# Patient Record
Sex: MALE | Race: BLACK OR AFRICAN AMERICAN | Employment: PART TIME
[De-identification: names, ages, dates, MRNs, and addresses within clinical notes are randomized per-mention and may not be internally consistent; named-entity substitution may affect disease eponyms.]

---

## 2017-01-04 ENCOUNTER — OFFICE VISIT (OUTPATIENT)
Dept: PODIATRY | Facility: CLINIC | Age: 47
End: 2017-01-04

## 2017-01-04 VITALS
TEMPERATURE: 97.9 F | HEART RATE: 65 BPM | HEIGHT: 66 IN | BODY MASS INDEX: 28.61 KG/M2 | DIASTOLIC BLOOD PRESSURE: 79 MMHG | WEIGHT: 178 LBS | SYSTOLIC BLOOD PRESSURE: 123 MMHG

## 2017-01-04 DIAGNOSIS — L89.90: ICD-10-CM

## 2017-01-04 DIAGNOSIS — M79.672 PAIN IN BOTH FEET: ICD-10-CM

## 2017-01-04 DIAGNOSIS — L84 CORNS AND CALLOSITIES: ICD-10-CM

## 2017-01-04 DIAGNOSIS — M20.5X2 HALLUX LIMITUS, LEFT: ICD-10-CM

## 2017-01-04 DIAGNOSIS — M20.10 HAV (HALLUX ABDUCTO VALGUS), UNSPECIFIED LATERALITY: ICD-10-CM

## 2017-01-04 DIAGNOSIS — M20.5X1 HALLUX LIMITUS, RIGHT: ICD-10-CM

## 2017-01-04 DIAGNOSIS — M79.671 PAIN IN BOTH FEET: ICD-10-CM

## 2017-01-04 DIAGNOSIS — D21.9: Primary | ICD-10-CM

## 2017-01-04 PROCEDURE — 11057 PARNG/CUTG B9 HYPRKR LES >4: CPT | Performed by: PODIATRIST

## 2017-01-04 PROCEDURE — 99213 OFFICE O/P EST LOW 20 MIN: CPT | Performed by: PODIATRIST

## 2017-02-01 ENCOUNTER — OFFICE VISIT (OUTPATIENT)
Dept: PODIATRY | Facility: CLINIC | Age: 47
End: 2017-02-01

## 2017-02-01 VITALS
HEIGHT: 66 IN | WEIGHT: 178 LBS | SYSTOLIC BLOOD PRESSURE: 124 MMHG | HEART RATE: 76 BPM | DIASTOLIC BLOOD PRESSURE: 81 MMHG | BODY MASS INDEX: 28.61 KG/M2

## 2017-02-01 DIAGNOSIS — M79.672 PAIN IN BOTH FEET: ICD-10-CM

## 2017-02-01 DIAGNOSIS — L84 CALLUS OF FOOT: ICD-10-CM

## 2017-02-01 DIAGNOSIS — Q82.8 POROKERATOSIS: Primary | ICD-10-CM

## 2017-02-01 DIAGNOSIS — M79.671 PAIN IN BOTH FEET: ICD-10-CM

## 2017-02-01 PROCEDURE — 99213 OFFICE O/P EST LOW 20 MIN: CPT | Performed by: PODIATRIST

## 2017-02-01 PROCEDURE — G8419 CALC BMI OUT NRM PARAM NOF/U: HCPCS | Performed by: PODIATRIST

## 2017-02-01 PROCEDURE — G8427 DOCREV CUR MEDS BY ELIG CLIN: HCPCS | Performed by: PODIATRIST

## 2017-02-01 PROCEDURE — 17110 DESTRUCTION B9 LES UP TO 14: CPT | Performed by: PODIATRIST

## 2017-02-01 PROCEDURE — G8484 FLU IMMUNIZE NO ADMIN: HCPCS | Performed by: PODIATRIST

## 2017-02-01 PROCEDURE — 4004F PT TOBACCO SCREEN RCVD TLK: CPT | Performed by: PODIATRIST

## 2017-02-15 ENCOUNTER — OFFICE VISIT (OUTPATIENT)
Dept: PODIATRY | Facility: CLINIC | Age: 47
End: 2017-02-15

## 2017-02-15 ENCOUNTER — HOSPITAL ENCOUNTER (OUTPATIENT)
Age: 47
Discharge: HOME | End: 2017-02-15
Payer: MEDICARE

## 2017-02-15 VITALS
WEIGHT: 183.4 LBS | HEIGHT: 66 IN | SYSTOLIC BLOOD PRESSURE: 116 MMHG | HEART RATE: 76 BPM | BODY MASS INDEX: 29.47 KG/M2 | DIASTOLIC BLOOD PRESSURE: 78 MMHG

## 2017-02-15 DIAGNOSIS — L84 HELOMA MOLLE: Primary | ICD-10-CM

## 2017-02-15 DIAGNOSIS — M79.675 PAIN OF TOE OF LEFT FOOT: ICD-10-CM

## 2017-02-15 PROCEDURE — 99213 OFFICE O/P EST LOW 20 MIN: CPT | Performed by: PODIATRIST

## 2017-02-15 PROCEDURE — G8419 CALC BMI OUT NRM PARAM NOF/U: HCPCS | Performed by: PODIATRIST

## 2017-02-15 PROCEDURE — 11055 PARING/CUTG B9 HYPRKER LES 1: CPT | Performed by: PODIATRIST

## 2017-02-15 PROCEDURE — 4004F PT TOBACCO SCREEN RCVD TLK: CPT | Performed by: PODIATRIST

## 2017-02-15 PROCEDURE — G8484 FLU IMMUNIZE NO ADMIN: HCPCS | Performed by: PODIATRIST

## 2017-02-15 PROCEDURE — G8427 DOCREV CUR MEDS BY ELIG CLIN: HCPCS | Performed by: PODIATRIST

## 2017-03-08 ENCOUNTER — HOSPITAL ENCOUNTER (OUTPATIENT)
Age: 47
Discharge: HOME | End: 2017-03-08
Payer: MEDICARE

## 2017-03-08 ENCOUNTER — OFFICE VISIT (OUTPATIENT)
Dept: PODIATRY | Facility: CLINIC | Age: 47
End: 2017-03-08

## 2017-03-08 VITALS
DIASTOLIC BLOOD PRESSURE: 80 MMHG | WEIGHT: 185 LBS | TEMPERATURE: 98.6 F | BODY MASS INDEX: 29.73 KG/M2 | HEART RATE: 79 BPM | HEIGHT: 66 IN | SYSTOLIC BLOOD PRESSURE: 134 MMHG

## 2017-03-08 DIAGNOSIS — M79.671 PAIN IN BOTH FEET: ICD-10-CM

## 2017-03-08 DIAGNOSIS — M79.672 PAIN IN BOTH FEET: ICD-10-CM

## 2017-03-08 DIAGNOSIS — M21.42 PES PLANUS OF BOTH FEET: ICD-10-CM

## 2017-03-08 DIAGNOSIS — L84 CALLUS OF FOOT: Primary | ICD-10-CM

## 2017-03-08 DIAGNOSIS — M21.41 PES PLANUS OF BOTH FEET: ICD-10-CM

## 2017-03-08 PROCEDURE — 4004F PT TOBACCO SCREEN RCVD TLK: CPT | Performed by: PODIATRIST

## 2017-03-08 PROCEDURE — G8419 CALC BMI OUT NRM PARAM NOF/U: HCPCS | Performed by: PODIATRIST

## 2017-03-08 PROCEDURE — 11056 PARNG/CUTG B9 HYPRKR LES 2-4: CPT | Performed by: PODIATRIST

## 2017-03-08 PROCEDURE — 99212 OFFICE O/P EST SF 10 MIN: CPT | Performed by: PODIATRIST

## 2017-03-08 PROCEDURE — 99213 OFFICE O/P EST LOW 20 MIN: CPT | Performed by: PODIATRIST

## 2017-03-08 PROCEDURE — G8484 FLU IMMUNIZE NO ADMIN: HCPCS | Performed by: PODIATRIST

## 2017-03-08 PROCEDURE — G8427 DOCREV CUR MEDS BY ELIG CLIN: HCPCS | Performed by: PODIATRIST

## 2017-03-09 ENCOUNTER — HOSPITAL ENCOUNTER (OUTPATIENT)
Age: 47
Discharge: HOME | End: 2017-03-09
Payer: MEDICARE

## 2017-03-09 ENCOUNTER — OFFICE VISIT (OUTPATIENT)
Dept: FAMILY MEDICINE CLINIC | Facility: CLINIC | Age: 47
End: 2017-03-09

## 2017-03-09 VITALS
DIASTOLIC BLOOD PRESSURE: 76 MMHG | HEART RATE: 75 BPM | BODY MASS INDEX: 30.08 KG/M2 | TEMPERATURE: 98.7 F | WEIGHT: 187.2 LBS | HEIGHT: 66 IN | SYSTOLIC BLOOD PRESSURE: 121 MMHG

## 2017-03-09 DIAGNOSIS — Z72.0 TOBACCO ABUSE: ICD-10-CM

## 2017-03-09 DIAGNOSIS — Z13.9 SCREENING: ICD-10-CM

## 2017-03-09 DIAGNOSIS — M10.00 ACUTE IDIOPATHIC GOUT, UNSPECIFIED SITE: Primary | ICD-10-CM

## 2017-03-09 PROCEDURE — G0009 ADMIN PNEUMOCOCCAL VACCINE: HCPCS | Performed by: FAMILY MEDICINE

## 2017-03-09 PROCEDURE — 90732 PPSV23 VACC 2 YRS+ SUBQ/IM: CPT | Performed by: FAMILY MEDICINE

## 2017-03-09 PROCEDURE — G8417 CALC BMI ABV UP PARAM F/U: HCPCS | Performed by: FAMILY MEDICINE

## 2017-03-09 PROCEDURE — 4004F PT TOBACCO SCREEN RCVD TLK: CPT | Performed by: FAMILY MEDICINE

## 2017-03-09 PROCEDURE — 90688 IIV4 VACCINE SPLT 0.5 ML IM: CPT | Performed by: FAMILY MEDICINE

## 2017-03-09 PROCEDURE — G8484 FLU IMMUNIZE NO ADMIN: HCPCS | Performed by: FAMILY MEDICINE

## 2017-03-09 PROCEDURE — 99213 OFFICE O/P EST LOW 20 MIN: CPT | Performed by: FAMILY MEDICINE

## 2017-03-09 PROCEDURE — G8427 DOCREV CUR MEDS BY ELIG CLIN: HCPCS | Performed by: FAMILY MEDICINE

## 2017-03-09 PROCEDURE — G0008 ADMIN INFLUENZA VIRUS VAC: HCPCS | Performed by: FAMILY MEDICINE

## 2017-03-09 RX ORDER — NICOTINE 21 MG/24HR
1 PATCH, TRANSDERMAL 24 HOURS TRANSDERMAL EVERY 24 HOURS
Qty: 30 PATCH | Refills: 3 | Status: SHIPPED | OUTPATIENT
Start: 2017-03-09 | End: 2017-04-13 | Stop reason: ALTCHOICE

## 2017-03-09 RX ORDER — NAPROXEN 500 MG/1
500 TABLET ORAL 2 TIMES DAILY WITH MEALS
Qty: 60 TABLET | Refills: 3 | Status: SHIPPED | OUTPATIENT
Start: 2017-03-09 | End: 2017-10-26 | Stop reason: SDUPTHER

## 2017-03-09 ASSESSMENT — ENCOUNTER SYMPTOMS
ABDOMINAL PAIN: 0
DIARRHEA: 0
SHORTNESS OF BREATH: 0
VOMITING: 0
NAUSEA: 0
BLOOD IN STOOL: 0

## 2017-04-13 ENCOUNTER — OFFICE VISIT (OUTPATIENT)
Dept: FAMILY MEDICINE CLINIC | Age: 47
End: 2017-04-13
Payer: MEDICARE

## 2017-04-13 ENCOUNTER — HOSPITAL ENCOUNTER (OUTPATIENT)
Age: 47
Setting detail: SPECIMEN
Discharge: HOME | End: 2017-04-13
Payer: MEDICARE

## 2017-04-13 VITALS
HEART RATE: 75 BPM | WEIGHT: 185.8 LBS | DIASTOLIC BLOOD PRESSURE: 79 MMHG | SYSTOLIC BLOOD PRESSURE: 129 MMHG | BODY MASS INDEX: 29.99 KG/M2 | TEMPERATURE: 98.3 F

## 2017-04-13 DIAGNOSIS — Z13.9 SCREENING: ICD-10-CM

## 2017-04-13 DIAGNOSIS — Z72.0 TOBACCO ABUSE: Primary | ICD-10-CM

## 2017-04-13 LAB — HIV AG/AB: NONREACTIVE

## 2017-04-13 PROCEDURE — G8417 CALC BMI ABV UP PARAM F/U: HCPCS | Performed by: FAMILY MEDICINE

## 2017-04-13 PROCEDURE — 4004F PT TOBACCO SCREEN RCVD TLK: CPT | Performed by: FAMILY MEDICINE

## 2017-04-13 PROCEDURE — 99213 OFFICE O/P EST LOW 20 MIN: CPT

## 2017-04-13 PROCEDURE — 99213 OFFICE O/P EST LOW 20 MIN: CPT | Performed by: FAMILY MEDICINE

## 2017-04-13 PROCEDURE — G8427 DOCREV CUR MEDS BY ELIG CLIN: HCPCS | Performed by: FAMILY MEDICINE

## 2017-04-13 RX ORDER — VARENICLINE TARTRATE 25 MG
KIT ORAL
Qty: 53 TABLET | Refills: 0 | Status: SHIPPED | OUTPATIENT
Start: 2017-04-13 | End: 2021-12-01

## 2017-04-13 ASSESSMENT — PATIENT HEALTH QUESTIONNAIRE - PHQ9
SUM OF ALL RESPONSES TO PHQ9 QUESTIONS 1 & 2: 0
1. LITTLE INTEREST OR PLEASURE IN DOING THINGS: 0
SUM OF ALL RESPONSES TO PHQ QUESTIONS 1-9: 0
2. FEELING DOWN, DEPRESSED OR HOPELESS: 0

## 2017-04-13 ASSESSMENT — ENCOUNTER SYMPTOMS
VOMITING: 0
NAUSEA: 0
COUGH: 0
DIARRHEA: 0
SHORTNESS OF BREATH: 0

## 2017-05-10 ENCOUNTER — PROCEDURE VISIT (OUTPATIENT)
Dept: PODIATRY | Age: 47
End: 2017-05-10
Payer: MEDICARE

## 2017-05-10 VITALS
BODY MASS INDEX: 29.25 KG/M2 | HEIGHT: 66 IN | DIASTOLIC BLOOD PRESSURE: 80 MMHG | WEIGHT: 182 LBS | SYSTOLIC BLOOD PRESSURE: 116 MMHG | HEART RATE: 73 BPM | TEMPERATURE: 98.1 F

## 2017-05-10 DIAGNOSIS — M79.672 PAIN IN BOTH FEET: ICD-10-CM

## 2017-05-10 DIAGNOSIS — B35.9 TINEA: ICD-10-CM

## 2017-05-10 DIAGNOSIS — M79.671 PAIN IN BOTH FEET: ICD-10-CM

## 2017-05-10 DIAGNOSIS — L84 HELOMA MOLLE: ICD-10-CM

## 2017-05-10 DIAGNOSIS — L84 CALLUS OF FOOT: Primary | ICD-10-CM

## 2017-05-10 PROCEDURE — 11057 PARNG/CUTG B9 HYPRKR LES >4: CPT | Performed by: PODIATRIST

## 2017-05-10 PROCEDURE — 99213 OFFICE O/P EST LOW 20 MIN: CPT

## 2017-05-10 PROCEDURE — 99213 OFFICE O/P EST LOW 20 MIN: CPT | Performed by: PODIATRIST

## 2017-05-10 RX ORDER — TOLNAFTATE 1 G/100G
POWDER TOPICAL
Qty: 1 CAN | Refills: 2 | Status: SHIPPED | OUTPATIENT
Start: 2017-05-10 | End: 2017-10-26 | Stop reason: ALTCHOICE

## 2017-06-14 ENCOUNTER — OFFICE VISIT (OUTPATIENT)
Dept: FAMILY MEDICINE CLINIC | Age: 47
End: 2017-06-14
Payer: MEDICARE

## 2017-06-14 VITALS
BODY MASS INDEX: 29.28 KG/M2 | DIASTOLIC BLOOD PRESSURE: 72 MMHG | TEMPERATURE: 96.7 F | HEIGHT: 66 IN | WEIGHT: 182.2 LBS | SYSTOLIC BLOOD PRESSURE: 130 MMHG | HEART RATE: 71 BPM

## 2017-06-14 DIAGNOSIS — Z72.0 TOBACCO ABUSE: ICD-10-CM

## 2017-06-14 DIAGNOSIS — M62.838 MUSCLE SPASM OF LEFT LOWER EXTREMITY: Primary | ICD-10-CM

## 2017-06-14 PROCEDURE — G8427 DOCREV CUR MEDS BY ELIG CLIN: HCPCS | Performed by: FAMILY MEDICINE

## 2017-06-14 PROCEDURE — 99213 OFFICE O/P EST LOW 20 MIN: CPT | Performed by: FAMILY MEDICINE

## 2017-06-14 PROCEDURE — 1036F TOBACCO NON-USER: CPT | Performed by: FAMILY MEDICINE

## 2017-06-14 PROCEDURE — 99213 OFFICE O/P EST LOW 20 MIN: CPT

## 2017-06-14 PROCEDURE — G8417 CALC BMI ABV UP PARAM F/U: HCPCS | Performed by: FAMILY MEDICINE

## 2017-06-14 ASSESSMENT — ENCOUNTER SYMPTOMS
BACK PAIN: 1
SHORTNESS OF BREATH: 0
BLOOD IN STOOL: 0
ABDOMINAL PAIN: 0
DIARRHEA: 0
NAUSEA: 0
VOMITING: 0

## 2017-06-21 ENCOUNTER — OFFICE VISIT (OUTPATIENT)
Dept: PODIATRY | Age: 47
End: 2017-06-21
Payer: MEDICARE

## 2017-06-21 VITALS
DIASTOLIC BLOOD PRESSURE: 66 MMHG | WEIGHT: 181 LBS | SYSTOLIC BLOOD PRESSURE: 115 MMHG | HEIGHT: 66 IN | BODY MASS INDEX: 29.09 KG/M2 | HEART RATE: 72 BPM | TEMPERATURE: 98.3 F

## 2017-06-21 DIAGNOSIS — L84 HELOMA MOLLE: ICD-10-CM

## 2017-06-21 DIAGNOSIS — M21.41 PES PLANUS OF BOTH FEET: ICD-10-CM

## 2017-06-21 DIAGNOSIS — M72.2 PLANTAR FASCIITIS OF RIGHT FOOT: ICD-10-CM

## 2017-06-21 DIAGNOSIS — M21.42 PES PLANUS OF BOTH FEET: ICD-10-CM

## 2017-06-21 DIAGNOSIS — L84 FOOT CALLUS: Primary | ICD-10-CM

## 2017-06-21 PROCEDURE — 99213 OFFICE O/P EST LOW 20 MIN: CPT | Performed by: PODIATRIST

## 2017-06-21 PROCEDURE — G8417 CALC BMI ABV UP PARAM F/U: HCPCS | Performed by: PODIATRIST

## 2017-06-21 PROCEDURE — 11057 PARNG/CUTG B9 HYPRKR LES >4: CPT | Performed by: PODIATRIST

## 2017-06-21 PROCEDURE — 1036F TOBACCO NON-USER: CPT | Performed by: PODIATRIST

## 2017-06-21 PROCEDURE — 99213 OFFICE O/P EST LOW 20 MIN: CPT

## 2017-06-21 PROCEDURE — G8427 DOCREV CUR MEDS BY ELIG CLIN: HCPCS | Performed by: PODIATRIST

## 2017-06-21 RX ORDER — UREA 200 MG/G
GEL TOPICAL
Qty: 1 CONTAINER | Refills: 5 | Status: SHIPPED | OUTPATIENT
Start: 2017-06-21 | End: 2021-12-01

## 2017-06-29 ENCOUNTER — HOSPITAL ENCOUNTER (OUTPATIENT)
Age: 47
Discharge: HOME | End: 2017-06-29
Payer: MEDICARE

## 2017-06-29 ENCOUNTER — HOSPITAL ENCOUNTER (OUTPATIENT)
Dept: GENERAL RADIOLOGY | Age: 47
Discharge: HOME | End: 2017-06-29
Payer: MEDICARE

## 2017-06-29 DIAGNOSIS — M72.2 PLANTAR FASCIITIS OF RIGHT FOOT: ICD-10-CM

## 2017-06-29 DIAGNOSIS — M21.42 PES PLANUS OF BOTH FEET: ICD-10-CM

## 2017-06-29 DIAGNOSIS — M21.41 PES PLANUS OF BOTH FEET: ICD-10-CM

## 2017-06-29 DIAGNOSIS — L84 FOOT CALLUS: ICD-10-CM

## 2017-06-29 PROCEDURE — 73630 X-RAY EXAM OF FOOT: CPT

## 2017-06-30 ENCOUNTER — HOSPITAL ENCOUNTER (OUTPATIENT)
Dept: PHYSICAL THERAPY | Age: 47
Setting detail: THERAPIES SERIES
Discharge: HOME | End: 2017-06-30
Payer: MEDICARE

## 2017-06-30 PROCEDURE — 97161 PT EVAL LOW COMPLEX 20 MIN: CPT

## 2017-06-30 PROCEDURE — 97110 THERAPEUTIC EXERCISES: CPT

## 2017-06-30 PROCEDURE — G8979 MOBILITY GOAL STATUS: HCPCS

## 2017-06-30 PROCEDURE — G8978 MOBILITY CURRENT STATUS: HCPCS

## 2017-07-06 ENCOUNTER — HOSPITAL ENCOUNTER (OUTPATIENT)
Dept: PHYSICAL THERAPY | Age: 47
Setting detail: THERAPIES SERIES
Discharge: HOME | End: 2017-07-06
Payer: COMMERCIAL

## 2017-07-10 ENCOUNTER — HOSPITAL ENCOUNTER (OUTPATIENT)
Dept: PHYSICAL THERAPY | Age: 47
Setting detail: THERAPIES SERIES
Discharge: HOME | End: 2017-07-10
Payer: COMMERCIAL

## 2017-07-10 PROCEDURE — 97110 THERAPEUTIC EXERCISES: CPT

## 2017-07-13 ENCOUNTER — HOSPITAL ENCOUNTER (OUTPATIENT)
Dept: PHYSICAL THERAPY | Age: 47
Setting detail: THERAPIES SERIES
Discharge: HOME | End: 2017-07-13
Payer: COMMERCIAL

## 2017-07-18 ENCOUNTER — HOSPITAL ENCOUNTER (EMERGENCY)
Age: 47
Discharge: HOME | End: 2017-07-18
Attending: EMERGENCY MEDICINE
Payer: COMMERCIAL

## 2017-07-18 ENCOUNTER — APPOINTMENT (OUTPATIENT)
Dept: GENERAL RADIOLOGY | Age: 47
End: 2017-07-18
Payer: COMMERCIAL

## 2017-07-18 VITALS
OXYGEN SATURATION: 99 % | SYSTOLIC BLOOD PRESSURE: 149 MMHG | TEMPERATURE: 98.8 F | DIASTOLIC BLOOD PRESSURE: 99 MMHG | RESPIRATION RATE: 16 BRPM | HEART RATE: 72 BPM

## 2017-07-18 DIAGNOSIS — S93.402A SPRAIN OF LEFT ANKLE, UNSPECIFIED LIGAMENT, INITIAL ENCOUNTER: Primary | ICD-10-CM

## 2017-07-18 PROCEDURE — 6370000000 HC RX 637 (ALT 250 FOR IP): Performed by: EMERGENCY MEDICINE

## 2017-07-18 PROCEDURE — 99284 EMERGENCY DEPT VISIT MOD MDM: CPT

## 2017-07-18 PROCEDURE — 73610 X-RAY EXAM OF ANKLE: CPT

## 2017-07-18 PROCEDURE — 73630 X-RAY EXAM OF FOOT: CPT

## 2017-07-18 RX ORDER — IBUPROFEN 800 MG/1
800 TABLET ORAL ONCE
Status: COMPLETED | OUTPATIENT
Start: 2017-07-18 | End: 2017-07-18

## 2017-07-18 RX ORDER — IBUPROFEN 200 MG
800 TABLET ORAL EVERY 8 HOURS PRN
Qty: 80 TABLET | Refills: 0 | Status: SHIPPED | OUTPATIENT
Start: 2017-07-18 | End: 2020-08-24

## 2017-07-18 RX ADMIN — IBUPROFEN 800 MG: 800 TABLET, FILM COATED ORAL at 02:39

## 2017-07-18 ASSESSMENT — PAIN SCALES - GENERAL
PAINLEVEL_OUTOF10: 10
PAINLEVEL_OUTOF10: 10

## 2017-07-18 ASSESSMENT — ENCOUNTER SYMPTOMS
VOMITING: 0
COUGH: 0
SHORTNESS OF BREATH: 0
NAUSEA: 0
SORE THROAT: 0
ABDOMINAL PAIN: 0

## 2017-07-18 ASSESSMENT — PAIN DESCRIPTION - ORIENTATION: ORIENTATION: LEFT

## 2017-07-18 ASSESSMENT — PAIN DESCRIPTION - LOCATION: LOCATION: ANKLE

## 2017-07-18 ASSESSMENT — PAIN DESCRIPTION - PAIN TYPE: TYPE: ACUTE PAIN

## 2017-09-22 ENCOUNTER — TELEPHONE (OUTPATIENT)
Dept: ADMINISTRATIVE | Age: 47
End: 2017-09-22

## 2017-09-29 ENCOUNTER — TELEPHONE (OUTPATIENT)
Dept: ADMINISTRATIVE | Age: 47
End: 2017-09-29

## 2017-10-26 ENCOUNTER — OFFICE VISIT (OUTPATIENT)
Dept: FAMILY MEDICINE CLINIC | Age: 47
End: 2017-10-26
Payer: COMMERCIAL

## 2017-10-26 VITALS
HEART RATE: 69 BPM | DIASTOLIC BLOOD PRESSURE: 79 MMHG | BODY MASS INDEX: 29.35 KG/M2 | SYSTOLIC BLOOD PRESSURE: 123 MMHG | HEIGHT: 66 IN | WEIGHT: 182.6 LBS | TEMPERATURE: 98.3 F

## 2017-10-26 DIAGNOSIS — Z00.00 ANNUAL PHYSICAL EXAM: Primary | ICD-10-CM

## 2017-10-26 DIAGNOSIS — M10.00 ACUTE IDIOPATHIC GOUT, UNSPECIFIED SITE: ICD-10-CM

## 2017-10-26 PROCEDURE — 90688 IIV4 VACCINE SPLT 0.5 ML IM: CPT | Performed by: FAMILY MEDICINE

## 2017-10-26 PROCEDURE — G0439 PPPS, SUBSEQ VISIT: HCPCS | Performed by: FAMILY MEDICINE

## 2017-10-26 PROCEDURE — G0008 ADMIN INFLUENZA VIRUS VAC: HCPCS | Performed by: FAMILY MEDICINE

## 2017-10-26 RX ORDER — NAPROXEN 500 MG/1
500 TABLET ORAL 2 TIMES DAILY WITH MEALS
Qty: 60 TABLET | Refills: 3 | Status: SHIPPED | OUTPATIENT
Start: 2017-10-26 | End: 2019-09-09 | Stop reason: SDUPTHER

## 2017-10-26 ASSESSMENT — PATIENT HEALTH QUESTIONNAIRE - PHQ9
2. FEELING DOWN, DEPRESSED OR HOPELESS: 0
1. LITTLE INTEREST OR PLEASURE IN DOING THINGS: 0
SUM OF ALL RESPONSES TO PHQ9 QUESTIONS 1 & 2: 0
SUM OF ALL RESPONSES TO PHQ QUESTIONS 1-9: 0

## 2017-10-26 ASSESSMENT — ANXIETY QUESTIONNAIRES: GAD7 TOTAL SCORE: 0

## 2017-10-26 NOTE — PATIENT INSTRUCTIONS
Thank you for letting us take care of you today. We hope all your questions were addressed. If a question was overlooked or something else comes to mind after you return home, please contact a member of your Care Team listed below. Please make sure you have a routine office visit set up to follow-up on [address]. Your Care Team at [place] [address] is Team #4  Rajesh Buchanan MD (Faculty)  Marycarmen Montano MD (Geovanna Gray MD (Resident)  Evi Winters MD (Resident)  Ashu Lacey MD (Resident)  Abel Prakash MD (Resident)  MUKESH Ortiz., RMA  Fausto Parada., BRADEN Reynolds ([facility])  Cristiano Perez RN, ([id number])  Ingrid Martinez, Ph.D., (Behavioral Services)  Hong Huff, [facility] (Clinical Pharmacist)       Office phone number: [phone]    If you need to get in right away due to illness, please be advised we have \"Same Day\" appointments available Monday-Friday. Please call us at [phone] option #1 to schedule your \"Same Day\" appointment.

## 2017-10-26 NOTE — PROGRESS NOTES
Visit Information    Have you changed or started any medications since your last visit including any over-the-counter medicines, vitamins, or herbal medicines? no   Have you stopped taking any of your medications? Is so, why? -  no  Are you having any side effects from any of your medications? - no    Have you seen any other physician or provider since your last visit?  no   Have you had any other diagnostic tests since your last visit?  no   Have you been seen in the emergency room and/or had an admission in a hospital since we last saw you?  no   Have you had your routine dental cleaning in the past 6 months?  no     Do you have an active MyChart account? If no, what is the barrier?   Yes    Patient Care Team:  Kiersten Nino MD as PCP - Timbo Bonilla MD as PCP - [facility] Attributed Provider    Medical History Review  Past Medical, Family, and Social History reviewed and does not contribute to the patient presenting condition    Health Maintenance   Topic Date Due    Flu vaccine (1) 09/01/2017    Diabetes screen  03/18/2019    Lipid screen  03/18/2021    DTaP/Tdap/Td vaccine (2 - Td) 08/25/2026    HIV screen  Completed

## 2017-10-26 NOTE — PROGRESS NOTES
Medicare Annual Wellness Visit - Initial    Name: Leticia Johansen Date: 10/26/2017   MRN: [id number] Sex: Male   Age: 52 y.o. Ethnicity: Non-/Non    : 1970 Race: [facility] is here for   Chief Complaint   Patient presents with    Annual Exam        Screenings for behavioral, psychosocial and functional/safety risks, and cognitive dysfunction are all negative except as indicated below. These results, as well as other patient data from the [address] form, are documented in Flowsheets linked to this Encounter. No Known Allergies    Prior to Visit Medications    Medication Sig Taking? Authorizing Provider   naproxen (NAPROSYN) 500 MG tablet Take 1 tablet by mouth 2 times daily (with meals) Yes Sage Ho MD   ibuprofen (ADVIL;MOTRIN) 200 MG tablet Take 4 tablets by mouth every 8 hours as needed for Pain Yes Marni Arnold MD   urea (CARMOL) 30 % cream Apply topically as needed. Yes MarianNATHANIEL SpauldingM   colchicine (COLCRYS) 0.6 MG tablet take 1 tablet by mouth once daily for 7 days Yes Sherif Ayon MD   urea (CARMOL) 20 % cream Apply topically as needed. Marian Kell, DPM   varenicline (CHANTIX STARTING MONTH ) 0.5 MG X 11 & 1 MG X 42 tablet Take by mouth.   Sage Ho MD       Past Medical History:   Diagnosis Date    Gout        Past Surgical History:   Procedure Laterality Date    APPENDECTOMY      WISDOM TOOTH EXTRACTION         Family History   Problem Relation Age of Onset    High Blood Pressure Mother     Alzheimer's Disease Father        CareTeam (Including outside providers/suppliers regularly involved in providing care):   Patient Care Team:  Barrett Lima MD as PCP - Toño Gomez MD as PCP - [facility] Attributed Provider    Wt Readings from Last 3 Encounters:   10/26/17 182 lb 9.6 oz (82.8 kg)   17 181 lb (82.1 kg)   17 182 lb 3.2 oz (82.6 kg)     Vitals:    10/26/17 0834   BP: 123/79   Site: Left Arm   Position: Sitting   Cuff Size: Large Adult   Pulse: 69   Temp: 98.3 °F (36.8 °C)   TempSrc: Temporal   Weight: 182 lb 9.6 oz (82.8 kg)   Height: 5' 6\" (1.676 m)       General Appearance: alert and oriented to person, place and time, well developed and well- nourished, in no acute distress  Skin: warm and dry, no rash or erythema  Head: normocephalic and atraumatic  Eyes: pupils equal, round, and reactive to light, extraocular eye movements intact, conjunctivae normal  ENT: tympanic membrane, external ear and ear canal normal bilaterally, nose without deformity, nasal mucosa and turbinates normal without polyps  Neck: supple and non-tender without mass, no thyromegaly or thyroid nodules, no cervical lymphadenopathy  Pulmonary/Chest: clear to auscultation bilaterally- no wheezes, rales or rhonchi, normal air movement, no respiratory distress  Cardiovascular: normal rate, regular rhythm, normal S1 and S2, no murmurs, rubs, clicks, or gallops, distal pulses intact, no carotid bruits  Abdomen: soft, non-tender, non-distended, normal bowel sounds, no masses or organomegaly  Extremities: no cyanosis, clubbing or edema  Musculoskeletal: normal range of motion, no joint swelling, deformity or tenderness  Neurologic: reflexes normal and symmetric, no cranial nerve deficit, gait, coordination and speech normal    The following problems were reviewed today and where indicated follow up appointments were made and/or referrals ordered.     Risk Factor Screenings with Interventions     Fall Risk:  2 or more falls in past year?: no  Fall with injury in past year?: no  Fall Risk Interventions:    · none indicated    Depression:  PHQ-2 Score: 0  Depression Interventions:  · None indicated    Anxiety:  Anxiety Score: 0  Anxiety Interventions:  · Relaxation techniques discussed    Cognitive:     Cognitive Impairment Interventions:  · None indicated    Substance Abuse:  Social History     Social History Main Topics    Smoking status: Former

## 2017-10-27 NOTE — PROGRESS NOTES
I have reviewed and discussed key elements of [address] [facility] with the resident including plan of care and follow up and agree with the care judie plan.

## 2019-09-09 ENCOUNTER — HOSPITAL ENCOUNTER (EMERGENCY)
Age: 49
Discharge: HOME | End: 2019-09-09
Attending: EMERGENCY MEDICINE
Payer: MEDICARE

## 2019-09-09 VITALS
TEMPERATURE: 97.9 F | DIASTOLIC BLOOD PRESSURE: 91 MMHG | BODY MASS INDEX: 29.38 KG/M2 | SYSTOLIC BLOOD PRESSURE: 144 MMHG | WEIGHT: 182 LBS | HEART RATE: 80 BPM | OXYGEN SATURATION: 98 % | RESPIRATION RATE: 16 BRPM

## 2019-09-09 DIAGNOSIS — M10.00 ACUTE IDIOPATHIC GOUT, UNSPECIFIED SITE: ICD-10-CM

## 2019-09-09 DIAGNOSIS — M10.062 ACUTE IDIOPATHIC GOUT OF LEFT KNEE: Primary | ICD-10-CM

## 2019-09-09 PROCEDURE — 99283 EMERGENCY DEPT VISIT LOW MDM: CPT

## 2019-09-09 PROCEDURE — 6370000000 HC RX 637 (ALT 250 FOR IP): Performed by: EMERGENCY MEDICINE

## 2019-09-09 RX ORDER — NAPROXEN 500 MG/1
500 TABLET ORAL 2 TIMES DAILY WITH MEALS
Qty: 14 TABLET | Refills: 0 | Status: SHIPPED | OUTPATIENT
Start: 2019-09-09 | End: 2021-12-01

## 2019-09-09 RX ORDER — COLCHICINE 0.6 MG/1
1.2 TABLET ORAL ONCE
Status: COMPLETED | OUTPATIENT
Start: 2019-09-09 | End: 2019-09-09

## 2019-09-09 RX ORDER — COLCHICINE 0.6 MG/1
0.6 TABLET ORAL ONCE
Qty: 1 TABLET | Refills: 0 | Status: SHIPPED | OUTPATIENT
Start: 2019-09-09 | End: 2021-12-01

## 2019-09-09 RX ADMIN — COLCHICINE 1.2 MG: 0.6 TABLET, FILM COATED ORAL at 09:57

## 2019-09-09 ASSESSMENT — ENCOUNTER SYMPTOMS
SHORTNESS OF BREATH: 0
ABDOMINAL PAIN: 0
BACK PAIN: 0
VOMITING: 0

## 2019-09-09 ASSESSMENT — PAIN DESCRIPTION - PAIN TYPE: TYPE: ACUTE PAIN

## 2019-09-09 ASSESSMENT — PAIN DESCRIPTION - LOCATION: LOCATION: KNEE

## 2019-09-09 ASSESSMENT — PAIN DESCRIPTION - ORIENTATION: ORIENTATION: LEFT

## 2019-09-09 ASSESSMENT — PAIN SCALES - GENERAL: PAINLEVEL_OUTOF10: 8

## 2019-09-09 NOTE — ED PROVIDER NOTES
cream Apply topically as needed. Qty: 1 Tube, Refills: 3      varenicline (CHANTIX STARTING MONTH GEE) 0.5 MG X 11 & 1 MG X 42 tablet Take by mouth. Qty: 53 tablet, Refills: 0    Associated Diagnoses: Tobacco abuse             ALLERGIES     has No Known Allergies. FAMILY HISTORY     He indicated that his mother is alive. He indicated that his father is . He indicated that his maternal grandmother is . He indicated that his maternal grandfather is . He indicated that his paternal grandmother is . He indicated that his paternal grandfather is . family history includes Alzheimer's Disease in his father; High Blood Pressure in his mother. SOCIAL HISTORY      reports that he has quit smoking. His smoking use included cigarettes. He has a 5.75 pack-year smoking history. He has never used smokeless tobacco. He reports that he drinks alcohol. He reports that he does not use drugs. PHYSICAL EXAM     INITIAL VITALS:  weight is 182 lb (82.6 kg). His temperature is 97.9 °F (36.6 °C). His blood pressure is 144/91 (abnormal) and his pulse is 80. His respiration is 16 and oxygen saturation is 98%. Physical Exam   Constitutional: He is oriented to person, place, and time. He appears well-developed and well-nourished. No distress. HENT:   Head: Normocephalic and atraumatic. Cardiovascular: Normal rate and intact distal pulses. Strong DP and PT pulses   Pulmonary/Chest: Effort normal.   Musculoskeletal:   Left knee mild edema warmth. However full range of motion active and passive. No calf tenderness, no leg edema   Neurological: He is alert and oriented to person, place, and time. 5 out of 5 strength in the left lower externally, intact sensation distally. Skin: Skin is warm and dry. No rash noted. No erythema. Psychiatric: He has a normal mood and affect.  His behavior is normal.       DIFFERENTIAL DIAGNOSIS/ MDM:     Exam consistent with gout given prior

## 2020-04-28 ENCOUNTER — HOSPITAL ENCOUNTER (EMERGENCY)
Age: 50
Discharge: HOME | End: 2020-04-28
Attending: EMERGENCY MEDICINE
Payer: MEDICARE

## 2020-04-28 VITALS
OXYGEN SATURATION: 96 % | RESPIRATION RATE: 18 BRPM | HEART RATE: 87 BPM | DIASTOLIC BLOOD PRESSURE: 73 MMHG | TEMPERATURE: 98.2 F | SYSTOLIC BLOOD PRESSURE: 141 MMHG

## 2020-04-28 PROCEDURE — 6360000002 HC RX W HCPCS: Performed by: STUDENT IN AN ORGANIZED HEALTH CARE EDUCATION/TRAINING PROGRAM

## 2020-04-28 PROCEDURE — 99283 EMERGENCY DEPT VISIT LOW MDM: CPT

## 2020-04-28 PROCEDURE — 6370000000 HC RX 637 (ALT 250 FOR IP): Performed by: STUDENT IN AN ORGANIZED HEALTH CARE EDUCATION/TRAINING PROGRAM

## 2020-04-28 PROCEDURE — 96374 THER/PROPH/DIAG INJ IV PUSH: CPT

## 2020-04-28 RX ORDER — IBUPROFEN 400 MG/1
400 TABLET ORAL EVERY 6 HOURS PRN
Qty: 30 TABLET | Refills: 0 | Status: SHIPPED | OUTPATIENT
Start: 2020-04-28 | End: 2020-08-24

## 2020-04-28 RX ORDER — CYCLOBENZAPRINE HCL 5 MG
5 TABLET ORAL 2 TIMES DAILY PRN
Qty: 10 TABLET | Refills: 0 | Status: SHIPPED | OUTPATIENT
Start: 2020-04-28 | End: 2020-05-08

## 2020-04-28 RX ORDER — LIDOCAINE 4 G/G
1 PATCH TOPICAL ONCE
Status: DISCONTINUED | OUTPATIENT
Start: 2020-04-28 | End: 2020-04-28 | Stop reason: HOSPADM

## 2020-04-28 RX ORDER — ACETAMINOPHEN 325 MG/1
650 TABLET ORAL ONCE
Status: COMPLETED | OUTPATIENT
Start: 2020-04-28 | End: 2020-04-28

## 2020-04-28 RX ORDER — KETOROLAC TROMETHAMINE 30 MG/ML
30 INJECTION, SOLUTION INTRAMUSCULAR; INTRAVENOUS ONCE
Status: COMPLETED | OUTPATIENT
Start: 2020-04-28 | End: 2020-04-28

## 2020-04-28 RX ORDER — ACETAMINOPHEN 325 MG/1
325 TABLET ORAL EVERY 6 HOURS PRN
Qty: 30 TABLET | Refills: 0 | Status: SHIPPED | OUTPATIENT
Start: 2020-04-28 | End: 2020-08-24

## 2020-04-28 RX ADMIN — ACETAMINOPHEN 650 MG: 325 TABLET ORAL at 15:27

## 2020-04-28 RX ADMIN — KETOROLAC TROMETHAMINE 30 MG: 30 INJECTION, SOLUTION INTRAMUSCULAR at 15:27

## 2020-04-28 ASSESSMENT — PAIN DESCRIPTION - DESCRIPTORS: DESCRIPTORS: ACHING;STABBING

## 2020-04-28 ASSESSMENT — ENCOUNTER SYMPTOMS
BACK PAIN: 1
VOMITING: 0
RHINORRHEA: 0
ABDOMINAL DISTENTION: 0
CHEST TIGHTNESS: 0
CONSTIPATION: 0
SHORTNESS OF BREATH: 0
NAUSEA: 0
WHEEZING: 0
ABDOMINAL PAIN: 0
TROUBLE SWALLOWING: 0
SORE THROAT: 0
COUGH: 0
DIARRHEA: 0

## 2020-04-28 ASSESSMENT — PAIN DESCRIPTION - PAIN TYPE: TYPE: ACUTE PAIN

## 2020-04-28 ASSESSMENT — PAIN DESCRIPTION - LOCATION: LOCATION: BACK

## 2020-04-28 ASSESSMENT — PAIN SCALES - GENERAL
PAINLEVEL_OUTOF10: 9
PAINLEVEL_OUTOF10: 9

## 2020-04-28 ASSESSMENT — PAIN DESCRIPTION - FREQUENCY: FREQUENCY: CONTINUOUS

## 2020-04-28 ASSESSMENT — PAIN DESCRIPTION - ORIENTATION: ORIENTATION: LOWER

## 2020-04-28 NOTE — ED PROVIDER NOTES
[facility]  ED  Emergency Department Encounter  Emergency Medicine Resident     Pt Name: Tiny Dyer  MRN: [id number]  [place] 1970  Date of evaluation: 4/28/20  PCP:  Francisco Bautista MD    [address] [place] [address]       Chief Complaint   Patient presents with    Back Pain       HISTORY OFPRESENT ILLNESS  (Location/Symptom, Timing/Onset, Context/Setting, Quality, Duration, Modifying Rhina Rilab.)      Tiny Dyer is a 47 yo male who presents with isolated back pain across his mid lumbar. Patient states the pain began 2 days ago when he is moving a fiberglass tub by himself. Patient states the pain was mild at first but has been increasing in intensity over the past 2 days to the point that he had some difficulty at work today. Patient states the pain is completely relieved by lying down and that it does not bother him when standing up but with sharp movements of the back or with weightbearing he begins to have pain which she rates 9 out of 10 in intensity. Patient denies fever, chills, IV drug use, change in bowel or bladder function, bilateral lower extremity weakness, loss of sensation, saddle anesthesia. Patient has no fever, chills, runny nose no sick contacts, known COVID contacts    PAST MEDICAL / SURGICAL / SOCIAL / FAMILY HISTORY      has a past medical history of Gout.     has a past surgical history that includes Appendectomy and [place] tooth extraction.      Social History     Socioeconomic History    Marital status:      Spouse name: Not on file    Number of children: Not on file    Years of education: Not on file    Highest education level: Not on file   Occupational History    Not on file   Social Needs    Financial resource strain: Not on file    Food insecurity     Worry: Not on file     Inability: Not on file    Transportation needs     Medical: Not on file     Non-medical: Not on file   Tobacco Use    Smoking status: Former Smoker     Packs/day: 0.25 Years: 23.00     Pack years: 5.75     Types: Cigarettes    Smokeless tobacco: Never Used    Tobacco comment: quit smoking 4/1/17   Substance and Sexual Activity    Alcohol use: Yes     Comment: socially    Drug use: No    Sexual activity: Not on file   Lifestyle    Physical activity     Days per week: Not on file     Minutes per session: Not on file    Stress: Not on file   Relationships    Social connections     Talks on phone: Not on file     Gets together: Not on file     Attends Sabianism service: Not on file     Active member of club or organization: Not on file     Attends meetings of clubs or organizations: Not on file     Relationship status: Not on file    Intimate partner violence     Fear of current or ex partner: Not on file     Emotionally abused: Not on file     Physically abused: Not on file     Forced sexual activity: Not on file   Other Topics Concern    Not on file   Social History Narrative    Not on file       Family History   Problem Relation Age of Onset    High Blood Pressure Mother     Alzheimer's Disease Father         Allergies:  Patient has no known allergies. Home Medications:  Prior to Admission medications    Medication Sig Start Date End Date Taking?  Authorizing Provider   ibuprofen (IBU) 400 MG tablet Take 1 tablet by mouth every 6 hours as needed for Pain 4/28/20  Yes Jessica Rachel MD   acetaminophen (TYLENOL) 325 MG tablet Take 1 tablet by mouth every 6 hours as needed for Pain 4/28/20  Yes Jessica Rachel MD   cyclobenzaprine (FLEXERIL) 5 MG tablet Take 1 tablet by mouth 2 times daily as needed for Muscle spasms 4/28/20 5/8/20 Yes Jessica Rachel MD   colchicine (COLCRYS) 0.6 MG tablet Take 1 tablet by mouth once for 1 dose 9/9/19 9/9/19  Vlad Nieves MD   naproxen (NAPROSYN) 500 MG tablet Take 1 tablet by mouth 2 times daily (with meals) 9/9/19   Vlad Nieves MD   ibuprofen (ADVIL;MOTRIN) 200 MG tablet Take 4 tablets by mouth every 8 hours display      RADIOLOGY:  No results found. EKG      All EKG's are interpreted by the Emergency Department Physicianwho either signs or Co-signs this chart in the absence of a cardiologist.    EMERGENCY DEPARTMENT COURSE:      Secondary to the fact the patient has no acute trauma to the area, there is tenderness to palpation only at the paraspinal area, lack of change in bowel or bladder function, no saddle anesthesia or weakness is believed that this is most likely a muscle strain or soft tissue contusion. Patient's pain has been properly reduced by nonnarcotic analgesia, plan to discharge with lidocaine patch on, Tylenol, ibuprofen and 5 days with the Flexeril and have patient follow-up with her primary care provider if symptoms persist    PROCEDURES:  None    CONSULTS:  None    CRITICAL CARE:  Please see attending note    FINAL IMPRESSION      1. Strain of lumbar region, initial encounter          DISPOSITION / PLAN     DISPOSITION Decision To Discharge 04/28/2020 04:00:03 PM      PATIENT REFERRED TO:  [facility]  [address] [place] [address]  [phone]    As needed    Stephenie Corrales MD  [address]  [phone]    In 2 weeks  if symptoms persist      DISCHARGE MEDICATIONS:  Discharge Medication List as of 4/28/2020  4:02 PM      START taking these medications    Details   !! ibuprofen (IBU) 400 MG tablet Take 1 tablet by mouth every 6 hours as needed for Pain, Disp-30 tablet, R-0Print      acetaminophen (TYLENOL) 325 MG tablet Take 1 tablet by mouth every 6 hours as needed for Pain, Disp-30 tablet, R-0Print      cyclobenzaprine (FLEXERIL) 5 MG tablet Take 1 tablet by mouth 2 times daily as needed for Muscle spasms, Disp-10 tablet, R-0Print       !! - Potential duplicate medications found. Please discuss with provider.           Jessica Rachel MD  Emergency Medicine Resident    (Please note that portions of this note were completed with a voice

## 2020-05-08 ENCOUNTER — APPOINTMENT (OUTPATIENT)
Dept: GENERAL RADIOLOGY | Age: 50
End: 2020-05-08
Payer: MEDICARE

## 2020-05-08 ENCOUNTER — HOSPITAL ENCOUNTER (EMERGENCY)
Age: 50
Discharge: HOME | End: 2020-05-08
Attending: EMERGENCY MEDICINE
Payer: MEDICARE

## 2020-05-08 VITALS
DIASTOLIC BLOOD PRESSURE: 94 MMHG | SYSTOLIC BLOOD PRESSURE: 151 MMHG | WEIGHT: 167 LBS | TEMPERATURE: 98.5 F | BODY MASS INDEX: 26.84 KG/M2 | HEIGHT: 66 IN | RESPIRATION RATE: 16 BRPM | HEART RATE: 95 BPM | OXYGEN SATURATION: 100 %

## 2020-05-08 PROCEDURE — 93005 ELECTROCARDIOGRAM TRACING: CPT | Performed by: STUDENT IN AN ORGANIZED HEALTH CARE EDUCATION/TRAINING PROGRAM

## 2020-05-08 PROCEDURE — 99283 EMERGENCY DEPT VISIT LOW MDM: CPT

## 2020-05-08 PROCEDURE — 73030 X-RAY EXAM OF SHOULDER: CPT

## 2020-05-08 PROCEDURE — 72040 X-RAY EXAM NECK SPINE 2-3 VW: CPT

## 2020-05-08 PROCEDURE — 96374 THER/PROPH/DIAG INJ IV PUSH: CPT

## 2020-05-08 PROCEDURE — 6360000002 HC RX W HCPCS: Performed by: STUDENT IN AN ORGANIZED HEALTH CARE EDUCATION/TRAINING PROGRAM

## 2020-05-08 RX ORDER — TIZANIDINE 2 MG/1
2 TABLET ORAL NIGHTLY PRN
Qty: 10 TABLET | Refills: 0 | Status: SHIPPED | OUTPATIENT
Start: 2020-05-08 | End: 2021-12-01

## 2020-05-08 RX ORDER — KETOROLAC TROMETHAMINE 30 MG/ML
30 INJECTION, SOLUTION INTRAMUSCULAR; INTRAVENOUS ONCE
Status: COMPLETED | OUTPATIENT
Start: 2020-05-08 | End: 2020-05-08

## 2020-05-08 RX ADMIN — KETOROLAC TROMETHAMINE 30 MG: 30 INJECTION, SOLUTION INTRAMUSCULAR at 11:34

## 2020-05-08 ASSESSMENT — ENCOUNTER SYMPTOMS
TROUBLE SWALLOWING: 0
ABDOMINAL PAIN: 0
VOMITING: 0
BACK PAIN: 1
DIARRHEA: 0
NAUSEA: 0
RHINORRHEA: 0
CHEST TIGHTNESS: 0
SHORTNESS OF BREATH: 0
WHEEZING: 0
CONSTIPATION: 0
COUGH: 0
SORE THROAT: 0
ABDOMINAL DISTENTION: 0

## 2020-05-08 ASSESSMENT — PAIN DESCRIPTION - ORIENTATION: ORIENTATION: LEFT

## 2020-05-08 ASSESSMENT — PAIN SCALES - GENERAL: PAINLEVEL_OUTOF10: 8

## 2020-05-08 ASSESSMENT — PAIN DESCRIPTION - LOCATION: LOCATION: SHOULDER

## 2020-05-08 NOTE — ED PROVIDER NOTES
Use    Smoking status: Former Smoker     Packs/day: 0.25     Years: 23.00     Pack years: 5.75     Types: Cigarettes    Smokeless tobacco: Never Used    Tobacco comment: quit smoking 4/1/17   Substance and Sexual Activity    Alcohol use: Yes     Comment: socially    Drug use: No    Sexual activity: Not on file   Lifestyle    Physical activity     Days per week: Not on file     Minutes per session: Not on file    Stress: Not on file   Relationships    Social connections     Talks on phone: Not on file     Gets together: Not on file     Attends Protestant service: Not on file     Active member of club or organization: Not on file     Attends meetings of clubs or organizations: Not on file     Relationship status: Not on file    Intimate partner violence     Fear of current or ex partner: Not on file     Emotionally abused: Not on file     Physically abused: Not on file     Forced sexual activity: Not on file   Other Topics Concern    Not on file   Social History Narrative    Not on file       Family History   Problem Relation Age of Onset    High Blood Pressure Mother     Alzheimer's Disease Father         Allergies:  Patient has no known allergies. Home Medications:  Prior to Admission medications    Medication Sig Start Date End Date Taking?  Authorizing Provider   tiZANidine (ZANAFLEX) 2 MG tablet Take 1 tablet by mouth nightly as needed (for muscle pain) 5/8/20  Yes Swathi Lund MD   ibuprofen (IBU) 400 MG tablet Take 1 tablet by mouth every 6 hours as needed for Pain 4/28/20   Swathi Lund MD   acetaminophen (TYLENOL) 325 MG tablet Take 1 tablet by mouth every 6 hours as needed for Pain 4/28/20   Swathi Lund MD   cyclobenzaprine (FLEXERIL) 5 MG tablet Take 1 tablet by mouth 2 times daily as needed for Muscle spasms 4/28/20 5/8/20  Swathi Lund MD   colchicine (COLCRYS) 0.6 MG tablet Take 1 tablet by mouth once for 1 dose 9/9/19 9/9/19  Ame Barriga MD   naproxen (NAPROSYN) 500 MG tablet Take 1 tablet by mouth 2 times daily (with meals) 9/9/19   Dora Rivas MD   ibuprofen (ADVIL;MOTRIN) 200 MG tablet Take 4 tablets by mouth every 8 hours as needed for Pain 7/18/17   Antoinette Vera MD   urea (CARMOL) 20 % cream Apply topically as needed. 6/21/17   Ana Pelaez DPM   urea (CARMOL) 30 % cream Apply topically as needed. 5/10/17   Cassidy Vizcaino DPM   varenicline (CHANTIX STARTING MONTH PAK) 0.5 MG X 11 & 1 MG X 42 tablet Take by mouth. 4/13/17   Moni Pina MD       REVIEW OFSYSTEMS    (2-9 systems for level 4, 10 or more for level 5)      Review of Systems   Constitutional: Negative for chills, diaphoresis, fatigue and fever. HENT: Negative for rhinorrhea, sore throat, tinnitus and trouble swallowing. Eyes: Negative for visual disturbance. Respiratory: Negative for cough, chest tightness, shortness of breath and wheezing. Cardiovascular: Negative for chest pain and leg swelling. Gastrointestinal: Negative for abdominal distention, abdominal pain, constipation, diarrhea, nausea and vomiting. Endocrine: Negative for polyuria. Genitourinary: Negative for dysuria, flank pain and frequency. Musculoskeletal: Positive for arthralgias (Left shoulder) and back pain. Negative for joint swelling and myalgias. Neurological: Negative for dizziness, tremors, seizures, weakness, light-headedness, numbness and headaches. PHYSICAL EXAM   (up to 7 for level 4, 8 or more forlevel 5)      INITIAL VITALS:   ED Triage Vitals [05/08/20 1104]   BP Temp Temp Source Pulse Resp SpO2 Height Weight   -- 98.5 °F (36.9 °C) Oral -- -- -- -- --       Physical Exam  Constitutional:       General: He is not in acute distress. Appearance: He is well-developed. HENT:      Head: Normocephalic and atraumatic. Right Ear: External ear normal.      Left Ear: External ear normal.   Eyes:      General: No scleral icterus. Right eye: No discharge.          Left eye: EMERGENCYDEPARTMENT COURSE / [facility]     LABS:  Labs Reviewed - No data to display      RADIOLOGY:  Xr Cervical Spine (2-3 Views)    Result Date: 5/8/2020  EXAMINATION: 3 XRAY VIEWS OF THE CERVICAL SPINE 5/8/2020 12:57 pm COMPARISON: None. HISTORY: ORDERING SYSTEM PROVIDED HISTORY: left cervical spinal tenderness TECHNOLOGIST PROVIDED HISTORY: left cervical spinal tenderness Reason for Exam: no injury Acuity: Acute Type of Exam: Initial FINDINGS: No fracture or spondylolisthesis is demonstrated. There is moderate C3/C4 degenerative disc disease. The posterior elements appear intact. The soft tissues are unremarkable. 1. No acute osseous abnormality of the cervical spine. 2. Moderate C3/C4 degenerative disc disease. Xr Shoulder Left (min 2 Views)    Result Date: 5/8/2020  EXAMINATION: THREE XRAY VIEWS OF THE LEFT SHOULDER 5/8/2020 12:57 pm COMPARISON: None. HISTORY: ORDERING SYSTEM PROVIDED HISTORY: shoulder tenderness to palpation TECHNOLOGIST PROVIDED HISTORY: shoulder tenderness to palpation Reason for Exam: no injury Acuity: Acute Type of Exam: Initial FINDINGS: There is no acute fracture or dislocation. Glenohumeral joint space is preserved. Subacromial space is preserved. AC joint is within normal limits. No acute osseous abnormality in the left shoulder. EKG  Normal sinus, normal rate, left axis deviation, no acute changes ST or T wave, Q waves V1, potential poor R wave progression    All EKG's are interpreted by the Emergency Department Physicianwho either signs or Co-signs this chart in the absence of a cardiologist.    EMERGENCY DEPARTMENT COURSE:    Patient pain resolved with Toradol, x-rays negative for acute pathology, patient noted that pain is likely secondary to muscle strain, stated Zanaflex not working, patient told to try Zanaflex at night as needed for muscle pain, as well as to continue take ibuprofen and Tylenol.   Patient has no questions at time of

## 2020-05-08 NOTE — ED NOTES
Pt resting in bed in NAD with even and unlabored rr  Will continue to assess      Zackery Manzo, PING  05/08/20 9170

## 2020-05-08 NOTE — ED PROVIDER NOTES
diaphoresis hemoptysis. This occurred after lifting a heavy object at work. On exam is hypertensive afebrile nontoxic. Neck is supple nontender in the midline, nontender with axial loading. Mild tenderness over the trapezius and the superior aspect of the scapula. Chest nontender. Lungs are clear. Card exam is benign. Pulses are symmetrical.  No edema cords Homans or calf tenderness. Impression is musculoskeletal pain, muscle strain. Plan is imaging, ice, analgesics, muscle relaxants, follow-up. Renata Lau.  Dm Razo MD, [facility]  Attending Emergency  Physician                Amish Bobby MD  05/08/20 3992

## 2020-05-09 LAB
EKG ATRIAL RATE: 74 BPM
EKG P AXIS: 33 DEGREES
EKG P-R INTERVAL: 160 MS
EKG Q-T INTERVAL: 330 MS
EKG QRS DURATION: 80 MS
EKG QTC CALCULATION (BAZETT): 366 MS
EKG R AXIS: -8 DEGREES
EKG T AXIS: 27 DEGREES
EKG VENTRICULAR RATE: 74 BPM

## 2020-05-09 PROCEDURE — 93010 ELECTROCARDIOGRAM REPORT: CPT | Performed by: INTERNAL MEDICINE

## 2020-08-24 ENCOUNTER — HOSPITAL ENCOUNTER (EMERGENCY)
Age: 50
Discharge: HOME | End: 2020-08-24
Attending: EMERGENCY MEDICINE
Payer: MEDICARE

## 2020-08-24 VITALS
OXYGEN SATURATION: 99 % | HEIGHT: 66 IN | HEART RATE: 71 BPM | RESPIRATION RATE: 16 BRPM | TEMPERATURE: 98.8 F | DIASTOLIC BLOOD PRESSURE: 79 MMHG | SYSTOLIC BLOOD PRESSURE: 122 MMHG | WEIGHT: 175 LBS | BODY MASS INDEX: 28.12 KG/M2

## 2020-08-24 PROCEDURE — 99283 EMERGENCY DEPT VISIT LOW MDM: CPT

## 2020-08-24 PROCEDURE — 6370000000 HC RX 637 (ALT 250 FOR IP): Performed by: STUDENT IN AN ORGANIZED HEALTH CARE EDUCATION/TRAINING PROGRAM

## 2020-08-24 RX ORDER — ACETAMINOPHEN 325 MG/1
650 TABLET ORAL EVERY 8 HOURS PRN
Qty: 30 TABLET | Refills: 0 | Status: SHIPPED | OUTPATIENT
Start: 2020-08-24 | End: 2021-12-01

## 2020-08-24 RX ORDER — ACETAMINOPHEN 500 MG
1000 TABLET ORAL ONCE
Status: COMPLETED | OUTPATIENT
Start: 2020-08-24 | End: 2020-08-24

## 2020-08-24 RX ORDER — IBUPROFEN 800 MG/1
800 TABLET ORAL EVERY 8 HOURS PRN
Qty: 21 TABLET | Refills: 0 | Status: SHIPPED | OUTPATIENT
Start: 2020-08-24 | End: 2021-12-01

## 2020-08-24 RX ORDER — IBUPROFEN 800 MG/1
800 TABLET ORAL ONCE
Status: COMPLETED | OUTPATIENT
Start: 2020-08-24 | End: 2020-08-24

## 2020-08-24 RX ORDER — LIDOCAINE 4 G/G
1 PATCH TOPICAL DAILY
Qty: 30 PATCH | Refills: 0 | Status: SHIPPED | OUTPATIENT
Start: 2020-08-24 | End: 2020-09-23

## 2020-08-24 RX ORDER — LIDOCAINE 4 G/G
1 PATCH TOPICAL ONCE
Status: DISCONTINUED | OUTPATIENT
Start: 2020-08-24 | End: 2020-08-24 | Stop reason: HOSPADM

## 2020-08-24 RX ORDER — CYCLOBENZAPRINE HCL 5 MG
5 TABLET ORAL 2 TIMES DAILY PRN
Qty: 10 TABLET | Refills: 0 | Status: SHIPPED | OUTPATIENT
Start: 2020-08-24 | End: 2021-12-01

## 2020-08-24 RX ADMIN — ACETAMINOPHEN 1000 MG: 500 TABLET ORAL at 09:03

## 2020-08-24 RX ADMIN — IBUPROFEN 800 MG: 800 TABLET, FILM COATED ORAL at 09:03

## 2020-08-24 ASSESSMENT — PAIN DESCRIPTION - ORIENTATION: ORIENTATION: LEFT;LOWER

## 2020-08-24 ASSESSMENT — ENCOUNTER SYMPTOMS
COUGH: 0
SHORTNESS OF BREATH: 0
NAUSEA: 0
ABDOMINAL PAIN: 0
BACK PAIN: 1
VOMITING: 0

## 2020-08-24 ASSESSMENT — PAIN DESCRIPTION - DESCRIPTORS: DESCRIPTORS: TIGHTNESS;CRAMPING

## 2020-08-24 ASSESSMENT — PAIN DESCRIPTION - LOCATION: LOCATION: BACK

## 2020-08-24 ASSESSMENT — PAIN SCALES - GENERAL
PAINLEVEL_OUTOF10: 10
PAINLEVEL_OUTOF10: 10

## 2020-08-24 ASSESSMENT — PAIN DESCRIPTION - FREQUENCY: FREQUENCY: CONTINUOUS

## 2020-08-24 NOTE — ED PROVIDER NOTES
[facility]  ED  Emergency Department Encounter  Emergency Medicine Resident     Pt Name: Merari Downey  MRN: [id number]  [place] 1970  Date of evaluation: 8/24/20  PCP:  No primary care provider on file. CHIEF COMPLAINT       Chief Complaint   Patient presents with    Back Pain     lower left back pain       HISTORY OFPRESENT ILLNESS  (Location/Symptom, Timing/Onset, Context/Setting, Quality, Duration, Modifying Derrick Gallegos.)      Merari Downey is a 48 y.o. male who presents with complaints of left lower back pain. Patient with past medical history of appendectomy, gout. 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 He states that he has similar exacerbation approximately 2 to 3 months ago which improved with muscle relaxant. He states that for the last 2 to 3 days he has had left lower back pain which he describes as intermittent, worse with movement and bending over. He denies any fever, IV drug use, numbness, weakness, loss of bowel or bladder function, unintended weight loss, abdominal pain, nausea, vomiting, diarrhea, constipation, lightheadedness, dizziness, dysuria, hematuria, testicular pain or swelling. He has not been taking anything at home for pain. He does occasionally use a back brace at work to help with his symptoms. PAST MEDICAL / SURGICAL / SOCIAL / FAMILY HISTORY      has a past medical history of Gout.     has a past surgical history that includes Appendectomy and [place] tooth extraction.      Social History     Socioeconomic History    Marital status:      Spouse name: Not on file    Number of children: Not on file    Years of education: Not on file    Highest education level: Not on file   Occupational History    Not on file   Social Needs    Financial resource strain: Not on file    Food insecurity     Worry: Not on file     Inability: Not on file    Transportation needs     Medical: Not on file Non-medical: Not on file   Tobacco Use    Smoking status: Current Every Day Smoker     Packs/day: 0.25     Years: 23.00     Pack years: 5.75     Types: Cigarettes    Smokeless tobacco: Never Used    Tobacco comment: quit smoking 4/1/17   Substance and Sexual Activity    Alcohol use: Yes     Comment: socially    Drug use: No    Sexual activity: Not on file   Lifestyle    Physical activity     Days per week: Not on file     Minutes per session: Not on file    Stress: Not on file   Relationships    Social connections     Talks on phone: Not on file     Gets together: Not on file     Attends Druze service: Not on file     Active member of club or organization: Not on file     Attends meetings of clubs or organizations: Not on file     Relationship status: Not on file    Intimate partner violence     Fear of current or ex partner: Not on file     Emotionally abused: Not on file     Physically abused: Not on file     Forced sexual activity: Not on file   Other Topics Concern    Not on file   Social History Narrative    Not on file       Family History   Problem Relation Age of Onset    High Blood Pressure Mother     Alzheimer's Disease Father         Allergies:  Patient has no known allergies. Home Medications:  Prior to Admission medications    Medication Sig Start Date End Date Taking?  Authorizing Provider   lidocaine 4 % external patch Place 1 patch onto the skin daily 8/24/20 9/23/20 Yes Kenia Tariq, DO   acetaminophen (TYLENOL) 325 MG tablet Take 2 tablets by mouth every 8 hours as needed for Pain 8/24/20  Yes Kenia Tariq, DO   ibuprofen (IBU) 800 MG tablet Take 1 tablet by mouth every 8 hours as needed for Pain 8/24/20  Yes Kenia Tariq, DO   cyclobenzaprine (FLEXERIL) 5 MG tablet Take 1 tablet by mouth 2 times daily as needed for Muscle spasms 8/24/20  Yes Kenia Tariq, DO   tiZANidine (ZANAFLEX) 2 MG tablet Take 1 tablet by mouth nightly as needed (for muscle pain) 5/8/20   Rishi No MD Arm[facility]   colchicine (COLCRYS) 0.6 MG tablet Take 1 tablet by mouth once for 1 dose 9/9/19 9/9/19  Sandra Roth MD   naproxen (NAPROSYN) 500 MG tablet Take 1 tablet by mouth 2 times daily (with meals) 9/9/19   Sandra Roth MD   urea (CARMOL) 20 % cream Apply topically as needed. 6/21/17   Ana Pelaez DPM   urea (CARMOL) 30 % cream Apply topically as needed. 5/10/17   Conchis Freed DPM   varenicline (CHANTIX STARTING MONTH PAK) 0.5 MG X 11 & 1 MG X 42 tablet Take by mouth. 4/13/17   Kellie Mccollum MD       REVIEW OFSYSTEMS    (2-9 systems for level 4, 10 or more for level 5)      Review of Systems   Constitutional: Negative for activity change, appetite change, chills, fatigue and fever. Respiratory: Negative for cough and shortness of breath. Cardiovascular: Negative for chest pain. Gastrointestinal: Negative for abdominal pain, nausea and vomiting. Genitourinary: Negative for difficulty urinating, dysuria and frequency. Musculoskeletal: Positive for back pain. Negative for gait problem. Skin: Negative for wound. Neurological: Negative for dizziness, syncope, weakness, light-headedness, numbness and headaches. PHYSICAL EXAM   (up to 7 for level 4, 8 or more forlevel 5)      INITIAL VITALS:   ED Triage Vitals [08/24/20 0834]   BP Temp Temp Source Pulse Resp SpO2 Height Weight   122/79 98.8 °F (37.1 °C) Oral 71 16 99 % 5' 6\" (1.676 m) 175 lb (79.4 kg)       Physical Exam  Vitals signs and nursing note reviewed. Constitutional:       General: He is not in acute distress. Appearance: Normal appearance. He is well-developed. He is not diaphoretic. HENT:      Head: Normocephalic and atraumatic. Nose: Nose normal.   Neck:      Musculoskeletal: Normal range of motion and neck supple. Cardiovascular:      Rate and Rhythm: Normal rate and regular rhythm. Heart sounds: Normal heart sounds.    Pulmonary:      Effort: Pulmonary effort is normal. No respiratory distress. Breath sounds: Normal breath sounds. Abdominal:      General: There is no distension. Palpations: Abdomen is soft. Tenderness: There is no abdominal tenderness. There is no guarding. Musculoskeletal: Normal range of motion. Comments: No midline cervical, thoracic, lumbar tenderness, no rash or wound noted to back, tenderness palpation of the left paraspinal lumbar region   Skin:     General: Skin is warm and dry. Neurological:      Mental Status: He is alert. Mental status is at baseline. Sensory: No sensory deficit. Motor: No weakness. Gait: Gait normal.      Deep Tendon Reflexes: Reflexes normal.   Psychiatric:         Behavior: Behavior normal.         Thought Content: Thought content normal.         DIFFERENTIAL  DIAGNOSIS     PLAN (LABS / IMAGING / EKG):  No orders of the defined types were placed in this encounter. MEDICATIONS ORDERED:  Orders Placed This Encounter   Medications    lidocaine 4 % external patch 1 patch    acetaminophen (TYLENOL) tablet 1,000 mg    ibuprofen (ADVIL;MOTRIN) tablet 800 mg    lidocaine 4 % external patch     Sig: Place 1 patch onto the skin daily     Dispense:  30 patch     Refill:  0    acetaminophen (TYLENOL) 325 MG tablet     Sig: Take 2 tablets by mouth every 8 hours as needed for Pain     Dispense:  30 tablet     Refill:  0    ibuprofen (IBU) 800 MG tablet     Sig: Take 1 tablet by mouth every 8 hours as needed for Pain     Dispense:  21 tablet     Refill:  0    cyclobenzaprine (FLEXERIL) 5 MG tablet     Sig: Take 1 tablet by mouth 2 times daily as needed for Muscle spasms     Dispense:  10 tablet     Refill:  0         Initial MDM/Plan/ED COURSE:    48 y.o. male who presents with complaints of low left back pain. On exam patient is well-appearing, nontoxic. Patient ambulated to the emergency department with a steady gait. Vital signs are within normal limits.   On physical exam abdomen is soft, nontender, nondistended. Patient with reproducible tenderness palpation of the left lower lumbar paraspinal muscles. No midline cervical, thoracic, lumbar tenderness. 5-5 strength in lateral lower extremities, sensation intact in bilateral lower extremities. Normal patellar DTRs bilaterally. Impression is likely lumbar muscle strain. Low suspicion for AAA as patient has reproducible pain on exam, abdomen is soft, nontender, nondistended, with no pulsatile mass, patient has no lightheadedness or dizziness. Low suspicion for kidney stone as patient has no dysuria, hematuria, appears no acute distress, pain is reproducible on palpation. Low suspicion for fracture given no history of trauma no midline pain. Low suspicion for epidural abscess or infection as patient is neurologically intact, afebrile with no high risk behavior such as IV drug use. We will plan for symptom control with lidocaine, Tylenol, Motrin. Will not give Flexeril here as patient did drive to the emergency department today. Patient provided with a prescription for lidocaine patch, Tylenol, Motrin and Flexeril instructed to use as needed as prescribed for pain. Instructed not take Flexeril prior to driving as it may make him drowsy. Patient given strict return precautions including any worsening or new symptoms such as fever, numbness, weakness, loss of bowel or bladder function, saddle anesthesia or any other new or concerning symptoms. Patient instructed to call PCP and schedule a follow-up point next week for reevaluation and establishment of care. Patient agreed for discharge at this time, all questions answered. Patient discharged home in stable condition. DIAGNOSTIC RESULTS / EMERGENCYDEPARTMENT COURSE / MDM     LABS:  Labs Reviewed - No data to display      PROCEDURES:  None    CONSULTS:  None    CRITICAL CARE:  Please see attending note    FINAL IMPRESSION      1.  Strain of lumbar region, initial encounter          DISPOSITION

## 2020-08-24 NOTE — ED PROVIDER NOTES
[facility]  Emergency Department  Faculty Attestation     I performed a history and physical examination of the patient and discussed management with the resident. I reviewed the residents note and agree with the documented findings and plan of care. Any areas of disagreement are noted on the chart. I was personally present for the key portions of any procedures. I have documented in the chart those procedures where I was not present during the key portions. I have reviewed the emergency nurses triage note. I agree with the chief complaint, past medical history, past surgical history, allergies, medications, social and family history as documented unless otherwise noted below. For Physician Assistant/ Nurse Practitioner cases/documentation I have personally evaluated this patient and have completed at least one if not all key elements of the E/M (history, physical exam, and MDM). Additional findings are as noted. Primary Care Physician:  No primary care provider on file. Screenings:  [unfilled]    CHIEF COMPLAINT       Chief Complaint   Patient presents with    Back Pain     lower left back pain       RECENT VITALS:   Temp: 98.8 °F (37.1 °C),  Pulse: 71, Resp: 16, BP: 122/79    LABS:  Labs Reviewed - No data to display    Radiology  No orders to display       Attending Physician Additional  Notes    Patient does heavy lifting at work and has left lower back pain without radiation. There is no direct trauma. No fever chills or sweats. No IV drug use. No bowel or bladder incontinence or retention. No saddle anesthesia. No lower extremity weakness. No history of AAA. Pain is worse with movement. It is relieved by rest.  Has had this previously. On exam he is uncomfortable pain score 10/10, vital signs are normal.  There is tenderness and muscle spasm over the left lumbar region. No midline spine tenderness. Negative straight leg raising.   Minimal tenderness over the left SI joint. Normal motor strength. Impression is lumbar strain/muscle strain. Plan is simple analgesics, muscle relaxants, consider lidocaine patch, limited lifting, follow-up. Marsha Eye.  Kathryn Raya MD, [facility]  Attending Emergency  Physician               Lilibeth George MD  08/24/20 9031

## 2021-05-31 ENCOUNTER — HOSPITAL ENCOUNTER (EMERGENCY)
Age: 51
Discharge: HOME | End: 2021-05-31
Attending: EMERGENCY MEDICINE
Payer: MEDICARE

## 2021-05-31 VITALS
HEIGHT: 66 IN | SYSTOLIC BLOOD PRESSURE: 137 MMHG | DIASTOLIC BLOOD PRESSURE: 89 MMHG | HEART RATE: 37 BPM | WEIGHT: 165 LBS | OXYGEN SATURATION: 98 % | BODY MASS INDEX: 26.52 KG/M2 | RESPIRATION RATE: 18 BRPM

## 2021-05-31 DIAGNOSIS — M79.605 LEFT LEG PAIN: Primary | ICD-10-CM

## 2021-05-31 PROCEDURE — 6360000002 HC RX W HCPCS: Performed by: STUDENT IN AN ORGANIZED HEALTH CARE EDUCATION/TRAINING PROGRAM

## 2021-05-31 PROCEDURE — 96372 THER/PROPH/DIAG INJ SC/IM: CPT

## 2021-05-31 PROCEDURE — 99282 EMERGENCY DEPT VISIT SF MDM: CPT

## 2021-05-31 RX ORDER — ORPHENADRINE CITRATE 30 MG/ML
60 INJECTION INTRAMUSCULAR; INTRAVENOUS ONCE
Status: COMPLETED | OUTPATIENT
Start: 2021-05-31 | End: 2021-05-31

## 2021-05-31 RX ORDER — CYCLOBENZAPRINE HCL 10 MG
10 TABLET ORAL 3 TIMES DAILY PRN
Qty: 21 TABLET | Refills: 0 | Status: SHIPPED | OUTPATIENT
Start: 2021-05-31 | End: 2021-06-10

## 2021-05-31 RX ADMIN — ORPHENADRINE CITRATE 60 MG: 60 INJECTION INTRAMUSCULAR; INTRAVENOUS at 07:24

## 2021-05-31 ASSESSMENT — ENCOUNTER SYMPTOMS
ABDOMINAL PAIN: 0
SHORTNESS OF BREATH: 0

## 2021-05-31 ASSESSMENT — PAIN DESCRIPTION - ONSET: ONSET: ON-GOING

## 2021-05-31 ASSESSMENT — PAIN DESCRIPTION - DESCRIPTORS: DESCRIPTORS: ACHING

## 2021-05-31 ASSESSMENT — PAIN DESCRIPTION - FREQUENCY: FREQUENCY: CONTINUOUS

## 2021-05-31 ASSESSMENT — PAIN DESCRIPTION - PROGRESSION: CLINICAL_PROGRESSION: NOT CHANGED

## 2021-05-31 ASSESSMENT — PAIN SCALES - GENERAL: PAINLEVEL_OUTOF10: 10

## 2021-05-31 ASSESSMENT — PAIN DESCRIPTION - ORIENTATION: ORIENTATION: POSTERIOR;UPPER

## 2021-05-31 ASSESSMENT — PAIN DESCRIPTION - PAIN TYPE: TYPE: ACUTE PAIN

## 2021-05-31 ASSESSMENT — PAIN DESCRIPTION - LOCATION: LOCATION: LEG

## 2021-05-31 NOTE — ED PROVIDER NOTES
[facility]     Emergency Department     Faculty Attestation    I performed a history and physical examination of the patient and discussed management with the resident. I reviewed the residents note and agree with the documented findings and plan of care. Any areas of disagreement are noted on the chart. I was personally present for the key portions of any procedures. I have documented in the chart those procedures where I was not present during the key portions. I have reviewed the emergency nurses triage note. I agree with the chief complaint, past medical history, past surgical history, allergies, medications, social and family history as documented unless otherwise noted below. For Physician Assistant/ Nurse Practitioner cases/documentation I have personally evaluated this patient and have completed at least one if not all key elements of the E/M (history, physical exam, and MDM). Additional findings are as noted. I have personally seen and evaluated the patient. I find the patient's history and physical exam are consistent with the NP/PA documentation. I agree with the care provided, treatment rendered, disposition and follow-up plan. Critical Care     Alicia Brunner M.D.   Attending Emergency  Physician              Stas Pruett MD  05/31/21 0171 Subsequent Stages Histo Method Verbiage: Using a similar technique to that described above, a thin layer of tissue was removed from all areas where tumor was visible on the previous stage.  The tissue was again oriented, mapped, dyed, and processed as above.

## 2021-05-31 NOTE — ED NOTES
Pt presents to the ED with c/o of left leg muscle pain. Pt states he twisted his leg and \"think I pulled a muscle\" while trying to get a neighbors dog off of his dog. Pt states the pain is 10/10 and has not taken anything for pain. Pt ambulatory with steady gate. Pt denies other c/o.         Gallo Stoddard RN  05/31/21 5192

## 2021-05-31 NOTE — ED PROVIDER NOTES
[facility]  ED  Emergency Department Encounter  EmergencyMedicine Resident     Pt Alejandro Javier  MRN: [id number]  [place] 1970  Date of evaluation: 5/31/21  PCP:  No primary care provider on file. CHIEF COMPLAINT       Chief Complaint   Patient presents with    Leg Pain     left leg, twisted leg trying to get dog off pts dog       HISTORY OF PRESENT ILLNESS  (Location/Symptom, Timing/Onset, Context/Setting, Quality, Duration, Modifying Factors, Severity.)      Kellen Fairchild is a 46 y.o. male who presents with pain of the left leg. Patient reports that he broke up a fight between dogs and when he ran towards the dogs that he felt a spasm and sudden pain in back of his left leg. Patient states that pain feels spasm-like, intermittently dull and achy and sometimes a sharp stabbing pain. Denies twisting his knee or any injury. Denies feeling a pop. Denies numbness, tingling, weakness, fever, chills or any other symptoms at this time    PAST MEDICAL / SURGICAL / SOCIAL / FAMILY HISTORY      has a past medical history of Gout.       has a past surgical history that includes Appendectomy and [place] tooth extraction.       Social History     Socioeconomic History    Marital status:      Spouse name: Not on file    Number of children: Not on file    Years of education: Not on file    Highest education level: Not on file   Occupational History    Not on file   Tobacco Use    Smoking status: Current Every Day Smoker     Packs/day: 0.25     Years: 23.00     Pack years: 5.75     Types: Cigarettes    Smokeless tobacco: Never Used    Tobacco comment: quit smoking 4/1/17   Vaping Use    Vaping Use: Never used   Substance and Sexual Activity    Alcohol use: Yes     Comment: socially    Drug use: No    Sexual activity: Not on file   Other Topics Concern    Not on file   Social History Narrative    Not on file     Social Determinants of Health     Financial Resource Strain:  Difficulty of Paying Living Expenses:    Food Insecurity:     Worried About Running Out of Food in the Last Year:     Ran Out of Food in the Last Year:    Transportation Needs:     Lack of Transportation (Medical):  Lack of Transportation (Non-Medical):    Physical Activity:     Days of Exercise per Week:     Minutes of Exercise per Session:    Stress:     Feeling of Stress :    Social Connections:     Frequency of Communication with Friends and Family:     Frequency of Social Gatherings with Friends and Family:     Attends [employer] Services:     Active Member of Clubs or Organizations:     Attends Club or Organization Meetings:     Marital Status:    Intimate Partner Violence:     Fear of Current or Ex-Partner:     Emotionally Abused:     Physically Abused:     Sexually Abused:        Family History   Problem Relation Age of Onset    High Blood Pressure Mother     Alzheimer's Disease Father        Allergies:  Patient has no known allergies. Home Medications:  Prior to Admission medications    Medication Sig Start Date End Date Taking?  Authorizing Provider   cyclobenzaprine (FLEXERIL) 10 MG tablet Take 1 tablet by mouth 3 times daily as needed for Muscle spasms 5/31/21 6/10/21 Yes Pepper Climes, DO   acetaminophen (TYLENOL) 325 MG tablet Take 2 tablets by mouth every 8 hours as needed for Pain 8/24/20   [facility], DO   ibuprofen (IBU) 800 MG tablet Take 1 tablet by mouth every 8 hours as needed for Pain 8/24/20   [facility], DO   cyclobenzaprine (FLEXERIL) 5 MG tablet Take 1 tablet by mouth 2 times daily as needed for Muscle spasms 8/24/20   [facility], DO   tiZANidine (ZANAFLEX) 2 MG tablet Take 1 tablet by mouth nightly as needed (for muscle pain) 5/8/20   Arleen Petit MD   colchicine (COLCRYS) 0.6 MG tablet Take 1 tablet by mouth once for 1 dose 9/9/19 9/9/19  Demetris Colmenares MD   naproxen (NAPROSYN) 500 MG tablet Take 1 tablet by mouth 2 times daily (with meals) 9/9/19   Blas Lazaro MD   urea (CARMOL) 20 % cream Apply topically as needed. 6/21/17   Ana Pelaez DPM   urea (CARMOL) 30 % cream Apply topically as needed. 5/10/17   Dang Ríos DPM   varenicline (CHANTIX STARTING MONTH PAK) 0.5 MG X 11 & 1 MG X 42 tablet Take by mouth. 4/13/17   Mehdi Payne MD       REVIEW OF SYSTEMS    (2-9 systems for level 4, 10 or more for level 5)      Review of Systems   Constitutional: Negative for fatigue and fever. Respiratory: Negative for shortness of breath. Gastrointestinal: Negative for abdominal pain. Genitourinary: Negative for flank pain. Musculoskeletal: Negative for arthralgias and gait problem. Tenderness of left leg   Skin: Negative for rash and wound. Neurological: Negative for headaches. Psychiatric/Behavioral: Negative for confusion. PHYSICAL EXAM   (up to 7 for level 4, 8 or more for level 5)      INITIAL VITALS:   /89   Pulse (!) 37   Resp 18   Ht 5' 6\" (1.676 m)   Wt 165 lb (74.8 kg)   SpO2 98%   BMI 26.63 kg/m²     Physical Exam  Constitutional:       General: He is not in acute distress. Appearance: He is not toxic-appearing. HENT:      Head: Normocephalic and atraumatic. Cardiovascular:      Rate and Rhythm: Normal rate. Comments: Strong DP and PT pulses of left foot  Pulmonary:      Effort: Pulmonary effort is normal.   Musculoskeletal:         General: Tenderness (tenderness to palpation of left posterior thigh with palpable muscle spasm) present. Right lower leg: No edema. Left lower leg: No edema. Skin:     Findings: No bruising, lesion or rash. Neurological:      Mental Status: He is alert. Sensory: No sensory deficit. Motor: No weakness. Gait: Gait normal.         DIFFERENTIAL  DIAGNOSIS     PLAN (LABS / IMAGING / EKG):  No orders of the defined types were placed in this encounter.       MEDICATIONS ORDERED:  Orders Placed This Encounter   Medications    orphenadrine (NORFLEX) injection 60 mg    cyclobenzaprine (FLEXERIL) 10 MG tablet     Sig: Take 1 tablet by mouth 3 times daily as needed for Muscle spasms     Dispense:  21 tablet     Refill:  0       DDX: Muscle spasm, hamstring sprain, hyperextension injury    DIAGNOSTIC RESULTS / EMERGENCY DEPARTMENT COURSE / MDM   LAB RESULTS:  No results found for this visit on 05/31/21. IMPRESSION/ ED Course: 49-year-old male no acute distress presenting with left leg pain. Vital signs within normal limits. Of note pulse rate states 37 on chart however this was documented incorrectly in EHR. Patient's actual heart rate on my evaluation was 83. Exam unremarkable. Does have some tenderness to palpation of the hamstring in the left leg. No palpable cords, no leg edema. Pulses intact. Sensation and strength intact in affected extremity. No joint swelling, effusion, erythema or warmth. Patient treated emergency department with IM Norflex for muscle spasm. Imaging not warranted as patient has no bony tenderness or evidence of joint injury. Given outpatient prescription for Flexeril. Given ED return cautions and follow directions. He verbalized understanding of and agreed with the discharge plan    PROCEDURES:  None    CONSULTS:  None    CRITICAL CARE:  Please see attending note    FINAL IMPRESSION      1.  Left leg pain          DISPOSITION / PLAN     DISPOSITION Decision To Discharge 05/31/2021 07:25:07 AM      PATIENT REFERRED TO:  [facility]  [address] [place] [address]  [phone]  Go to   As needed, If symptoms worsen      DISCHARGE MEDICATIONS:  New Prescriptions    CYCLOBENZAPRINE (FLEXERIL) 10 MG TABLET    Take 1 tablet by mouth 3 times daily as needed for Muscle spasms       Dre Chapin DO  Emergency Medicine Resident    (Please note that portions of thisnote were completed with a voice recognition program.  Efforts were made to edit the dictations but occasionally words are mis-transcribed.)       Hansa Sena DO  Resident  05/31/21 5735

## 2021-09-24 ENCOUNTER — HOSPITAL ENCOUNTER (EMERGENCY)
Age: 51
Discharge: HOME | End: 2021-09-24
Attending: EMERGENCY MEDICINE
Payer: MEDICARE

## 2021-09-24 VITALS
OXYGEN SATURATION: 98 % | RESPIRATION RATE: 16 BRPM | HEIGHT: 66 IN | HEART RATE: 89 BPM | BODY MASS INDEX: 28.28 KG/M2 | SYSTOLIC BLOOD PRESSURE: 138 MMHG | TEMPERATURE: 99.1 F | WEIGHT: 176 LBS | DIASTOLIC BLOOD PRESSURE: 82 MMHG

## 2021-09-24 DIAGNOSIS — J06.9 VIRAL URI: Primary | ICD-10-CM

## 2021-09-24 PROCEDURE — 99285 EMERGENCY DEPT VISIT HI MDM: CPT

## 2021-09-24 PROCEDURE — U0005 INFEC AGEN DETEC AMPLI PROBE: HCPCS

## 2021-09-24 PROCEDURE — U0003 INFECTIOUS AGENT DETECTION BY NUCLEIC ACID (DNA OR RNA); SEVERE ACUTE RESPIRATORY SYNDROME CORONAVIRUS 2 (SARS-COV-2) (CORONAVIRUS DISEASE [COVID-19]), AMPLIFIED PROBE TECHNIQUE, MAKING USE OF HIGH THROUGHPUT TECHNOLOGIES AS DESCRIBED BY CMS-2020-01-R: HCPCS

## 2021-09-24 PROCEDURE — 6360000002 HC RX W HCPCS: Performed by: STUDENT IN AN ORGANIZED HEALTH CARE EDUCATION/TRAINING PROGRAM

## 2021-09-24 PROCEDURE — 6370000000 HC RX 637 (ALT 250 FOR IP): Performed by: STUDENT IN AN ORGANIZED HEALTH CARE EDUCATION/TRAINING PROGRAM

## 2021-09-24 RX ORDER — DEXAMETHASONE SODIUM PHOSPHATE 10 MG/ML
10 INJECTION INTRAMUSCULAR; INTRAVENOUS ONCE
Status: COMPLETED | OUTPATIENT
Start: 2021-09-24 | End: 2021-09-24

## 2021-09-24 RX ORDER — ONDANSETRON 4 MG/1
4 TABLET, ORALLY DISINTEGRATING ORAL ONCE
Status: COMPLETED | OUTPATIENT
Start: 2021-09-24 | End: 2021-09-24

## 2021-09-24 RX ADMIN — DEXAMETHASONE SODIUM PHOSPHATE 10 MG: 10 INJECTION INTRAMUSCULAR; INTRAVENOUS at 09:53

## 2021-09-24 RX ADMIN — ONDANSETRON 4 MG: 4 TABLET, ORALLY DISINTEGRATING ORAL at 09:53

## 2021-09-24 ASSESSMENT — ENCOUNTER SYMPTOMS
COLOR CHANGE: 0
SHORTNESS OF BREATH: 0
SORE THROAT: 1
VOMITING: 0
ABDOMINAL PAIN: 0
COUGH: 1
NAUSEA: 0

## 2021-09-24 NOTE — ED PROVIDER NOTES
[facility]  ED  Emergency Department Encounter  Emergency Medicine Resident     Pt Name: Porsche White  MRN: [id number]  [place] 1970  Date of evaluation: 9/24/21  PCP:  No primary care provider on file. CHIEF COMPLAINT       No chief complaint on file. HISTORY OFPRESENT ILLNESS  (Location/Symptom, Timing/Onset, Context/Setting, Quality, Duration, Modifying Factors,Severity.)      Porsche White is a 46 y.o. male with no past medical history presents to the emergency department with complaints of alternating fever/chills, sore throat, nonproductive cough at home. Patient states symptoms began acutely at approximately 3 AM today. Patient admits to sensation of dizziness with positional changes but denies chest pain, shortness of breath, headache. Patient did receive both doses of his COVID-19 vaccination. Lives at home with his uncle who is feeling well, no known sick contacts at work. PAST MEDICAL / SURGICAL / SOCIAL / FAMILY HISTORY      has a past medical history of Gout.     has a past surgical history that includes Appendectomy and Sims tooth extraction.     Social History     Socioeconomic History    Marital status:      Spouse name: Not on file    Number of children: Not on file    Years of education: Not on file    Highest education level: Not on file   Occupational History    Not on file   Tobacco Use    Smoking status: Current Every Day Smoker     Packs/day: 0.25     Years: 23.00     Pack years: 5.75     Types: Cigarettes    Smokeless tobacco: Never Used    Tobacco comment: quit smoking 4/1/17   Vaping Use    Vaping Use: Never used   Substance and Sexual Activity    Alcohol use: Yes     Comment: socially    Drug use: No    Sexual activity: Not on file   Other Topics Concern    Not on file   Social History Narrative    Not on file     Social Determinants of Health     Financial Resource Strain:     Difficulty of Paying Living Expenses:    Food Insecurity:     Worried About Running Out of Food in the Last Year:     [address] in the Last Year:    Transportation Needs:     Lack of Transportation (Medical):  Lack of Transportation (Non-Medical):    Physical Activity:     Days of Exercise per Week:     Minutes of Exercise per Session:    Stress:     Feeling of Stress :    Social Connections:     Frequency of Communication with Friends and Family:     Frequency of Social Gatherings with Friends and Family:     Attends Zoroastrian Services:     Active Member of Clubs or Organizations:     Attends Club or Organization Meetings:     Marital Status:    Intimate Partner Violence:     Fear of Current or Ex-Partner:     Emotionally Abused:     Physically Abused:     Sexually Abused:        Family History   Problem Relation Age of Onset    High Blood Pressure Mother     Alzheimer's Disease Father        Allergies:  Patient has no known allergies. Home Medications:  Prior to Admission medications    Medication Sig Start Date End Date Taking? Authorizing Provider   acetaminophen (TYLENOL) 325 MG tablet Take 2 tablets by mouth every 8 hours as needed for Pain 8/24/20   Jaciel Galo DO   ibuprofen (IBU) 800 MG tablet Take 1 tablet by mouth every 8 hours as needed for Pain 8/24/20   aJciel Galo DO   cyclobenzaprine (FLEXERIL) 5 MG tablet Take 1 tablet by mouth 2 times daily as needed for Muscle spasms 8/24/20   Jaciel Galo DO   tiZANidine (ZANAFLEX) 2 MG tablet Take 1 tablet by mouth nightly as needed (for muscle pain) 5/8/20   Sophia Vincent MD   colchicine (COLCRYS) 0.6 MG tablet Take 1 tablet by mouth once for 1 dose 9/9/19 9/9/19  Thuy Vera MD   naproxen (NAPROSYN) 500 MG tablet Take 1 tablet by mouth 2 times daily (with meals) 9/9/19   Thuy Vera MD   urea (CARMOL) 20 % cream Apply topically as needed. 6/21/17   Ana Pelaez DPM   urea (CARMOL) 30 % cream Apply topically as needed.  5/10/17   Abhi Padilla DPM varenicline (CHANTIX STARTING MONTH GEE) 0.5 MG X 11 & 1 MG X 42 tablet Take by mouth. 4/13/17   Ashvin Shaikh MD       REVIEW OF SYSTEMS    (2-9 systems for level 4, 10 or more for level 5)      Review of Systems   Constitutional: Positive for chills and fever. HENT: Positive for congestion and sore throat. Respiratory: Positive for cough. Negative for shortness of breath. Cardiovascular: Negative for chest pain. Gastrointestinal: Negative for abdominal pain, nausea and vomiting. Musculoskeletal: Negative for myalgias. Skin: Negative for color change and rash. Neurological: Positive for dizziness. PHYSICAL EXAM   (up to 7 for level 4, 8 or more for level 5)     INITIAL VITALS:    height is 5' 6\" (1.676 m) and weight is 176 lb (79.8 kg). His temperature is 99.1 °F (37.3 °C). His blood pressure is 138/82 and his pulse is 89. His respiration is 16 and oxygen saturation is 98%. Physical Exam  Constitutional:       Comments: Alert and oriented person, place, time. Is well in appearance. Resting comfortably with no acute distress. HENT:      Mouth/Throat:      Mouth: Mucous membranes are moist.      Pharynx: Oropharynx is clear. No oropharyngeal exudate or posterior oropharyngeal erythema. Cardiovascular:      Rate and Rhythm: Normal rate and regular rhythm. Heart sounds: No murmur heard. No gallop. Pulmonary:      Effort: Pulmonary effort is normal. No respiratory distress. Breath sounds: Normal breath sounds. No wheezing. Abdominal:      General: Abdomen is flat. Palpations: Abdomen is soft. Tenderness: There is no abdominal tenderness. Musculoskeletal:      Right lower leg: No edema. Left lower leg: No edema. Neurological:      General: No focal deficit present. Mental Status: He is oriented to person, place, and time.       Comments: Cranial nerves II-XII equal gross intact with bilateral pupillary reflex intact, no visual field deficit, intact extraocular motion, no facial sensory deficits, no facial motor deficits, symmetrical palate ovation, intact tongue range of motion, good neck range of motion. No finger-nose ataxia. No pronator drift. Able to ambulate without difficulty. 5/5 strength bilateral upper and lower extremities         DIFFERENTIAL  DIAGNOSIS     PLAN (LABS / IMAGING / EKG):  Orders Placed This Encounter   Procedures    COVID-19       MEDICATIONS ORDERED:  Orders Placed This Encounter   Medications    ondansetron (ZOFRAN-ODT) disintegrating tablet 4 mg    dexamethasone (DECADRON) injection 10 mg       DDX: Viral URI, Covid    Initial MDM/Plan: 46 y.o. male who presents with 1 day of viral upper respiratory symptoms and dizziness with postural changes. Patient did receive his COVID-19 vaccination. Patient seen and examined, no acute distress. Vital signs are unremarkable. Speaking full sentences, no respiratory distress. He is well-appearing and resting comfortably in the hospital cot. Lung sounds clear to station bilaterally. No focal neurologic deficits. Impression is viral URI. Will obtain Covid testing, give symptomatic treatment with Zofran as well as single dose Decadron for viral pharyngitis. Less likely strep pharyngitis given no tonsillar exudate, no lymphadenopathy, no fevers, positive cough. Will discharge home. DIAGNOSTIC RESULTS / EMERGENCY DEPARTMENT COURSE / MDM     LABS:  Labs Reviewed   COVID-19         RADIOLOGY:  No results found. EMERGENCY DEPARTMENT COURSE:  ED Course as of Sep 24 1055   Fri Sep 24, 2021   269 80-year-old male with no past medical history presents emergency department with complaints of alternating fever/chills, sore throat, nonproductive cough at home. Patient states symptoms began acutely at approximately 3 AM today. Patient admits to sensation of dizziness with positional changes but denies chest pain, shortness of breath, headache.   Patient did receive both doses of his COVID-19 vaccination. Lives at home with his uncle who is feeling well, no known sick contacts at work. [DS]      ED Course User Index  [DS] Kay Isaacs DO     Patient medicated, Covid test obtained. Given note to quarantine until test results and encouraged to return for new or worsening symptoms. PROCEDURES:  None    CONSULTS:  None    CRITICAL CARE:  Please see attending note    FINAL IMPRESSION      1.  Viral URI          DISPOSITION / PLAN     DISPOSITION Decision To Discharge 09/24/2021 09:56:55 AM        PATIENTREFERRED TO:  [address] [place] [id number] [phone]  Schedule an appointment as soon as possible for a visit   As needed      DISCHARGE MEDICATIONS:  Discharge Medication List as of 9/24/2021 10:11 AM          Kay Isaacs DO  EmergencyMedicine Resident    (Please note that portions of this note were completed with a voice recognition program.  Efforts were made to edit the dictations but occasionally words are mis-transcribed.)        Kay Isaacs DO  Resident  09/24/21 7623

## 2021-09-24 NOTE — ED PROVIDER NOTES
[facility]     Emergency Department     Faculty Attestation    I performed a history and physical examination of the patient and discussed management with the resident. I reviewed the residents note and agree with the documented findings and plan of care. Any areas of disagreement are noted on the chart. I was personally present for the key portions of any procedures. I have documented in the chart those procedures where I was not present during the key portions. I have reviewed the emergency nurses triage note. I agree with the chief complaint, past medical history, past surgical history, allergies, medications, social and family history as documented unless otherwise noted below. For Physician Assistant/ Nurse Practitioner cases/documentation I have personally evaluated this patient and have completed at least one if not all key elements of the E/M (history, physical exam, and MDM). Additional findings are as noted. I have personally seen and evaluated the patient. I find the patient's history and physical exam are consistent with the NP/PA documentation. I agree with the care provided, treatment rendered, disposition and follow-up plan. URI symptoms since this morning HEENT exam generally unremarkable      Critical Care     Markus Gamboa M.D.   Attending Emergency  Physician              Amy Olivo MD  09/24/21 2174

## 2021-09-24 NOTE — ED TRIAGE NOTES
Woke up this morning with hot and cold flashes, nausea and generalized body aches. No know exposure to covid. Has been vaccinated.

## 2021-09-24 NOTE — ED NOTES
Pt to ed with c/o hot flashes/chills, sore throat with non productive cough. Pt states it hurts to swallow, no swelling noted. Pt is alert, oriented, speaking in full, complete sentences.       Sierra Weinstein RN  09/24/21 1002

## 2021-09-25 LAB
SARS-COV-2: ABNORMAL
SARS-COV-2: DETECTED
SOURCE: ABNORMAL

## 2021-09-27 ENCOUNTER — CARE COORDINATION (OUTPATIENT)
Dept: CARE COORDINATION | Age: 51
End: 2021-09-27

## 2021-09-27 NOTE — CARE COORDINATION
First attempt to reach pt for covid risk education s/p er visit left vm to call [facility] [phone]

## 2021-09-28 NOTE — CARE COORDINATION
Patient contacted regarding COVID-19 diagnosis. Discussed COVID-19 related testing which was available at this time. Test results were positive. Patient informed of results, if available? Yes. Ambulatory Care Manager contacted the patient by telephone to perform post discharge assessment. Call within 2 business days of discharge: Yes. Verified name and  with patient as identifiers. Provided introduction to self, and explanation of the CTN/ACM role, and reason for call due to risk factors for infection and/or exposure to COVID-19. Symptoms reviewed with patient who verbalized the following symptoms: chills or shaking and sweating. Due to no new or worsening symptoms encounter was not routed to provider for escalation. Discussed follow-up appointments. If no appointment was previously scheduled, appointment scheduling offered: Yes. [facility] [place] [place](s): No future appointments. Non-[facility] follow up appointment(s):     Non-face-to-face services provided:  Reviewed and followed up on pending diagnostic tests and treatments-covid      Advance Care Planning:   Does patient have an Advance Directive:  reviewed and current. Educated patient about risk for severe COVID-19 due to risk factors according to CDC guidelines. ACM reviewed discharge instructions, medical action plan and red flag symptoms with the patient who verbalized understanding. Discussed COVID vaccination status: Yes. Education provided on COVID-19 vaccination as appropriate. Discussed exposure protocols and quarantine with CDC Guidelines. Patient was given an opportunity to verbalize any questions and concerns and agrees to contact ACM or health care provider for questions related to their healthcare. Reviewed and educated patient on any new and changed medications related to discharge diagnosis     Was patient discharged with a pulse oximeter?  No Discussed and confirmed pulse oximeter discharge instructions and when to notify provider or seek emergency care. ACM provided contact information. No further follow-up call identified based on severity of symptoms and risk factors. Does have a new pt apt for oct 13.

## 2021-12-01 ENCOUNTER — HOSPITAL ENCOUNTER (EMERGENCY)
Age: 51
Discharge: HOME | End: 2021-12-01
Attending: EMERGENCY MEDICINE
Payer: MEDICARE

## 2021-12-01 VITALS
SYSTOLIC BLOOD PRESSURE: 148 MMHG | OXYGEN SATURATION: 100 % | RESPIRATION RATE: 16 BRPM | BODY MASS INDEX: 28.08 KG/M2 | DIASTOLIC BLOOD PRESSURE: 95 MMHG | HEART RATE: 81 BPM | WEIGHT: 174 LBS | TEMPERATURE: 97.9 F

## 2021-12-01 DIAGNOSIS — M25.562 ACUTE PAIN OF LEFT KNEE: Primary | ICD-10-CM

## 2021-12-01 PROCEDURE — 99284 EMERGENCY DEPT VISIT MOD MDM: CPT

## 2021-12-01 PROCEDURE — 96372 THER/PROPH/DIAG INJ SC/IM: CPT

## 2021-12-01 PROCEDURE — 6360000002 HC RX W HCPCS: Performed by: EMERGENCY MEDICINE

## 2021-12-01 RX ORDER — IBUPROFEN 800 MG/1
800 TABLET ORAL EVERY 8 HOURS PRN
Qty: 21 TABLET | Refills: 0 | Status: SHIPPED | OUTPATIENT
Start: 2021-12-01

## 2021-12-01 RX ORDER — KETOROLAC TROMETHAMINE 30 MG/ML
30 INJECTION, SOLUTION INTRAMUSCULAR; INTRAVENOUS ONCE
Status: COMPLETED | OUTPATIENT
Start: 2021-12-01 | End: 2021-12-01

## 2021-12-01 RX ADMIN — KETOROLAC TROMETHAMINE 30 MG: 30 INJECTION, SOLUTION INTRAMUSCULAR; INTRAVENOUS at 09:12

## 2021-12-01 ASSESSMENT — ENCOUNTER SYMPTOMS
SHORTNESS OF BREATH: 0
COUGH: 0
ABDOMINAL PAIN: 0
FACIAL SWELLING: 0
VOMITING: 0
DIARRHEA: 0
VOICE CHANGE: 0
NAUSEA: 0
WHEEZING: 0

## 2021-12-01 ASSESSMENT — PAIN DESCRIPTION - PAIN TYPE: TYPE: ACUTE PAIN

## 2021-12-01 ASSESSMENT — PAIN SCALES - GENERAL
PAINLEVEL_OUTOF10: 10
PAINLEVEL_OUTOF10: 10

## 2021-12-01 ASSESSMENT — PAIN DESCRIPTION - LOCATION: LOCATION: KNEE

## 2021-12-01 ASSESSMENT — PAIN DESCRIPTION - ORIENTATION: ORIENTATION: LEFT

## 2021-12-01 NOTE — Clinical Note
Fidel Villalba was seen and treated in our emergency department on 12/1/2021. He may return to work on 12/04/2021. If you have any questions or concerns, please don't hesitate to call.       Thee Mckeon MD

## 2021-12-01 NOTE — Clinical Note
Willy Arriola was seen and treated in our emergency department on 12/1/2021. He may return to work on 12/04/2021. If you have any questions or concerns, please don't hesitate to call.       Nelly Hylton MD

## 2021-12-01 NOTE — Clinical Note
Nicki Lopez was seen and treated in our emergency department on 12/1/2021. He may return to work on 12/04/2021. If you have any questions or concerns, please don't hesitate to call.       Mac Padron MD

## 2021-12-01 NOTE — ED PROVIDER NOTES
[facility] ED  EMERGENCY DEPARTMENT ENCOUNTER      Pt Name: Chato Richards  MRN: [id number]  [place] 1970  Date of evaluation: 12/1/2021  Provider: Roxie Conner MD    CHIEF COMPLAINT     Chief Complaint   Patient presents with    Knee Pain     c/o lt knee pain  starting yesterday. denies injury. OTC advil not really helping,          HISTORY OF PRESENT ILLNESS   (Location/Symptom, Timing/Onset, Context/Setting,Quality, Duration, Modifying Factors, Severity)  Note limiting factors. Chato Richards is a50 y.o. 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 He denies any falls or trauma, denies swelling, redness, fevers or chills, or history of gout. Has no hip pain, or other sick symptoms    Nursing Notes werereviewed. REVIEW OF SYSTEMS    (2-9 systems for level 4, 10 or more for level 5)     Review of Systems   Constitutional: Negative for appetite change, chills, fatigue and fever. HENT: Negative for drooling, facial swelling, mouth sores and voice change. Eyes: Negative for visual disturbance. Respiratory: Negative for cough, shortness of breath and wheezing. Cardiovascular: Negative for chest pain. Gastrointestinal: Negative for abdominal pain, diarrhea, nausea and vomiting. Genitourinary: Negative for difficulty urinating and dysuria. Musculoskeletal: Positive for arthralgias. Negative for neck stiffness. Skin: Negative for rash. Neurological: Negative for weakness and numbness. Psychiatric/Behavioral: Negative for agitation. All other systems reviewed and are negative. Except as noted above the remainder of the review of systems was reviewed and negative.        PAST MEDICAL HISTORY     Past Medical History:   Diagnosis Date    Gout          SURGICALHISTORY       Past Surgical History:   Procedure Laterality Date    APPENDECTOMY      CYNTHIA TOOTH EXTRACTION           CURRENT MEDICATIONS       Discharge Medication List as of 12/1/2021  9:19 AM            ALLERGIES   Patient has no known allergies. FAMILY HISTORY       Family History   Problem Relation Age of Onset    High Blood Pressure Mother     Alzheimer's Disease Father           SOCIAL HISTORY       Social History     Socioeconomic History    Marital status:      Spouse name: None    Number of children: None    Years of education: None    Highest education level: None   Occupational History    None   Tobacco Use    Smoking status: Current Every Day Smoker     Packs/day: 0.25     Years: 23.00     Pack years: 5.75     Types: Cigarettes    Smokeless tobacco: Never Used    Tobacco comment: quit smoking 4/1/17   Vaping Use    Vaping Use: Never used   Substance and Sexual Activity    Alcohol use: Yes     Comment: socially    Drug use: No    Sexual activity: None   Other Topics Concern    None   Social History Narrative    None     Social Determinants of Health     Financial Resource Strain:     Difficulty of Paying Living Expenses: Not on file   Food Insecurity:     Worried About Running Out of Food in the Last Year: Not on file    Nat of Food in the Last Year: Not on file   Transportation Needs:     Lack of Transportation (Medical): Not on file    Lack of Transportation (Non-Medical):  Not on file   Physical Activity:     Days of Exercise per Week: Not on file    Minutes of Exercise per Session: Not on file   Stress:     Feeling of Stress : Not on file   Social Connections:     Frequency of Communication with Friends and Family: Not on file    Frequency of Social Gatherings with Friends and Family: Not on file    Attends Cheondoism Services: Not on file    Active Member of Clubs or Organizations: Not on file    Attends Club or Organization Meetings: Not on file    Marital Status: Not on file   Intimate Partner Violence:     Fear of Current or Ex-Partner: Not on file    Emotionally Abused: Not on file    Physically Abused: Not on file    Sexually Abused: Not on file   Housing Stability:     Unable to Pay for Housing in the Last Year: Not on file    Number of [place] in the Last Year: Not on file    Unstable Housing in the Last Year: Not on file       SCREENINGS    Millie Coma Scale  Eye Opening: Spontaneous  Best Verbal Response: Oriented  Best Motor Response: Obeys commands  Millie Coma Scale Score: 15        PHYSICAL EXAM    (up to 7 for level 4, 8 or more for level 5)     ED Triage Vitals   BP Temp Temp Source Pulse Resp SpO2 Height Weight   12/01/21 0830 12/01/21 0830 12/01/21 0830 12/01/21 0830 12/01/21 0830 12/01/21 0830 -- 12/01/21 0846   (!) 148/95 97.9 °F (36.6 °C) Oral 81 16 100 %  174 lb (78.9 kg)       Physical Exam  Constitutional:       General: He is not in acute distress. Appearance: He is well-developed. HENT:      Head: Normocephalic and atraumatic. Eyes:      Conjunctiva/sclera: Conjunctivae normal.   Neck:      Vascular: No JVD. Cardiovascular:      Rate and Rhythm: Normal rate and regular rhythm. Pulmonary:      Effort: Pulmonary effort is normal. No respiratory distress. Breath sounds: No stridor. Abdominal:      General: There is no distension. Palpations: Abdomen is soft. Musculoskeletal:      Cervical back: Normal range of motion and neck supple. Comments: Has decreased range of motion but is able to flex to approximately 45 degrees, he has no effusion, no joint line tenderness but does have some fullness over the tibial tuberosity. He has laxity bilaterally on varus and valgus but left is greater than right. No anterior posterior laxity and negative Lachman's bilaterally. He is able to ambulate,   Skin:     General: Skin is warm and dry. Neurological:      Mental Status: He is alert and oriented to person, place, and time.          DIAGNOSTIC RESULTS     EKG: All EKG's are interpreted by the Emergency Department Physician who either signs orCo-signs this chart in the absence of a cardiologist.      RADIOLOGY:   Non-plainfilm images such as CT, Ultrasound and MRI are read by the radiologist. Plain radiographic images are visualized and preliminarily interpreted by the emergency physician with the below findings:      Interpretationper the Radiologist below, if available at the time of this note:    No orders to display         ED BEDSIDE ULTRASOUND:   Performed by ED Physician - none    LABS:  Labs Reviewed - No data to display    All other labs were within normal range or not returned as of this dictation. EMERGENCY DEPARTMENT COURSE and DIFFERENTIAL DIAGNOSIS/MDM:   Vitals:    Vitals:    12/01/21 0830 12/01/21 0846   BP: (!) 148/95    Pulse: 81    Resp: 16    Temp: 97.9 °F (36.6 °C)    TempSrc: Oral    SpO2: 100%    Weight:  174 lb (78.9 kg)         MDM  Number of Diagnoses or Management Options  Acute pain of left knee  Diagnosis management comments: Patient is able to stand and ambulate, however he does have limited range of motion. Given his varus and valgus laxity this is likely osteoarthritis. He has no systemic symptoms and no assessment findings concerning for infection or gout. We discussed risk and benefits of imaging and lab work, patient was amenable to Toradol shot, Motrin for home, Ace wrap and crutches and follow-up with PCP. I gave him several days off of work, recommend icing and elevation, and give him strict return precautions. Procedures    FINAL IMPRESSION      1.  Acute pain of left knee        DISPOSITION/PLAN   DISPOSITION Decision To Discharge 12/01/2021 09:01:26 AM      PATIENT REFERRED TO:  [address] [place] [address] Primary Care  [place] [address]  [phone]  Schedule an appointment as soon as possible for a visit in 3 days  As needed, If symptoms worsen    [address] [address] [facility]  [phone]  Schedule an appointment as soon as possible for a visit in 1 week  Your knee pain does not improve or worsens      DISCHARGE MEDICATIONS:  Discharge Medication List as of 12/1/2021  9:19 AM                 Summation      Patient Course:      ED Medications administered this visit:    Medications   ketorolac (TORADOL) injection 30 mg (30 mg IntraMUSCular Given 12/1/21 0912)       New Prescriptions from this visit:    Discharge Medication List as of 12/1/2021  9:19 AM          Follow-up:  [facility]  [place] [address]  [phone]  Schedule an appointment as soon as possible for a visit in 3 days  As needed, If symptoms worsen    [address] [address] [facility]  [phone]  Schedule an appointment as soon as possible for a visit in 1 week  Your knee pain does not improve or worsens        Final Impression:   1.  Acute pain of left knee               (Please note that portions of this note were completed with a voice recognition program.  Efforts were made to edit the dictations but occasionally words are mis-transcribed.)           Kelsi Echevarria MD  12/01/21 0954

## 2021-12-01 NOTE — Clinical Note
Madelyn Ojeda was seen and treated in our emergency department on 12/1/2021. He may return to work on 12/04/2021. If you have any questions or concerns, please don't hesitate to call.       Pablo Cobos MD

## 2023-01-25 ENCOUNTER — APPOINTMENT (OUTPATIENT)
Dept: GENERAL RADIOLOGY | Age: 53
End: 2023-01-25
Payer: MEDICARE

## 2023-01-25 ENCOUNTER — HOSPITAL ENCOUNTER (EMERGENCY)
Age: 53
Discharge: HOME | End: 2023-01-25
Attending: EMERGENCY MEDICINE
Payer: MEDICARE

## 2023-01-25 VITALS
BODY MASS INDEX: 28.28 KG/M2 | OXYGEN SATURATION: 98 % | RESPIRATION RATE: 16 BRPM | WEIGHT: 176 LBS | HEART RATE: 73 BPM | TEMPERATURE: 97 F | DIASTOLIC BLOOD PRESSURE: 94 MMHG | SYSTOLIC BLOOD PRESSURE: 146 MMHG | HEIGHT: 66 IN

## 2023-01-25 DIAGNOSIS — M10.9 ACUTE GOUT OF LEFT KNEE, UNSPECIFIED CAUSE: ICD-10-CM

## 2023-01-25 DIAGNOSIS — M25.562 ACUTE PAIN OF LEFT KNEE: Primary | ICD-10-CM

## 2023-01-25 LAB
ABSOLUTE EOS #: 0.29 K/UL (ref 0–0.44)
ABSOLUTE IMMATURE GRANULOCYTE: 0.06 K/UL (ref 0–0.3)
ABSOLUTE LYMPH #: 3.65 K/UL (ref 1.1–3.7)
ABSOLUTE MONO #: 0.81 K/UL (ref 0.1–1.2)
ANION GAP SERPL CALCULATED.3IONS-SCNC: 9 MMOL/L (ref 9–17)
BASOPHILS # BLD: 1 % (ref 0–2)
BASOPHILS ABSOLUTE: 0.07 K/UL (ref 0–0.2)
BUN BLDV-MCNC: 15 MG/DL (ref 6–20)
C-REACTIVE PROTEIN: <3 MG/L (ref 0–5)
CALCIUM SERPL-MCNC: 8.7 MG/DL (ref 8.6–10.4)
CHLORIDE BLD-SCNC: 102 MMOL/L (ref 98–107)
CO2: 22 MMOL/L (ref 20–31)
CREAT SERPL-MCNC: 1.08 MG/DL (ref 0.7–1.2)
EOSINOPHILS RELATIVE PERCENT: 3 % (ref 1–4)
GFR SERPL CREATININE-BSD FRML MDRD: >60 ML/MIN/1.73M2
GLUCOSE BLD-MCNC: 150 MG/DL (ref 70–99)
HCT VFR BLD CALC: 40.4 % (ref 40.7–50.3)
HEMOGLOBIN: 13.9 G/DL (ref 13–17)
IMMATURE GRANULOCYTES: 1 %
LYMPHOCYTES # BLD: 39 % (ref 24–43)
MCH RBC QN AUTO: 32.9 PG (ref 25.2–33.5)
MCHC RBC AUTO-ENTMCNC: 34.4 G/DL (ref 28.4–34.8)
MCV RBC AUTO: 95.7 FL (ref 82.6–102.9)
MONOCYTES # BLD: 9 % (ref 3–12)
NRBC AUTOMATED: 0 PER 100 WBC
PDW BLD-RTO: 12.7 % (ref 11.8–14.4)
PLATELET # BLD: 205 K/UL (ref 138–453)
PMV BLD AUTO: 10 FL (ref 8.1–13.5)
POTASSIUM SERPL-SCNC: 4 MMOL/L (ref 3.7–5.3)
RBC # BLD: 4.22 M/UL (ref 4.21–5.77)
SEDIMENTATION RATE, ERYTHROCYTE: 1 MM/HR (ref 0–20)
SEG NEUTROPHILS: 47 % (ref 36–65)
SEGMENTED NEUTROPHILS ABSOLUTE COUNT: 4.55 K/UL (ref 1.5–8.1)
SODIUM BLD-SCNC: 133 MMOL/L (ref 135–144)
URIC ACID: 6.5 MG/DL (ref 3.4–7)
WBC # BLD: 9.4 K/UL (ref 3.5–11.3)

## 2023-01-25 PROCEDURE — 6360000002 HC RX W HCPCS: Performed by: EMERGENCY MEDICINE

## 2023-01-25 PROCEDURE — 85025 COMPLETE CBC W/AUTO DIFF WBC: CPT

## 2023-01-25 PROCEDURE — 73562 X-RAY EXAM OF KNEE 3: CPT

## 2023-01-25 PROCEDURE — 84550 ASSAY OF BLOOD/URIC ACID: CPT

## 2023-01-25 PROCEDURE — 99284 EMERGENCY DEPT VISIT MOD MDM: CPT

## 2023-01-25 PROCEDURE — 73560 X-RAY EXAM OF KNEE 1 OR 2: CPT

## 2023-01-25 PROCEDURE — 86140 C-REACTIVE PROTEIN: CPT

## 2023-01-25 PROCEDURE — 6370000000 HC RX 637 (ALT 250 FOR IP): Performed by: EMERGENCY MEDICINE

## 2023-01-25 PROCEDURE — 96372 THER/PROPH/DIAG INJ SC/IM: CPT

## 2023-01-25 PROCEDURE — 85652 RBC SED RATE AUTOMATED: CPT

## 2023-01-25 PROCEDURE — 80048 BASIC METABOLIC PNL TOTAL CA: CPT

## 2023-01-25 RX ORDER — KETOROLAC TROMETHAMINE 15 MG/ML
15 INJECTION, SOLUTION INTRAMUSCULAR; INTRAVENOUS ONCE
Status: COMPLETED | OUTPATIENT
Start: 2023-01-25 | End: 2023-01-25

## 2023-01-25 RX ORDER — OXYCODONE HYDROCHLORIDE AND ACETAMINOPHEN 5; 325 MG/1; MG/1
1 TABLET ORAL ONCE
Status: COMPLETED | OUTPATIENT
Start: 2023-01-25 | End: 2023-01-25

## 2023-01-25 RX ORDER — KETOROLAC TROMETHAMINE 15 MG/ML
INJECTION, SOLUTION INTRAMUSCULAR; INTRAVENOUS
Status: DISCONTINUED
Start: 2023-01-25 | End: 2023-01-25 | Stop reason: HOSPADM

## 2023-01-25 RX ORDER — OXYCODONE HYDROCHLORIDE AND ACETAMINOPHEN 5; 325 MG/1; MG/1
1 TABLET ORAL EVERY 8 HOURS PRN
Qty: 10 TABLET | Refills: 0 | Status: SHIPPED | OUTPATIENT
Start: 2023-01-25 | End: 2023-02-01

## 2023-01-25 RX ORDER — KETOROLAC TROMETHAMINE 30 MG/ML
30 INJECTION, SOLUTION INTRAMUSCULAR; INTRAVENOUS ONCE
Status: DISCONTINUED | OUTPATIENT
Start: 2023-01-25 | End: 2023-01-25

## 2023-01-25 RX ORDER — ALLOPURINOL 100 MG/1
100 TABLET ORAL DAILY
Qty: 30 TABLET | Refills: 0 | Status: SHIPPED | OUTPATIENT
Start: 2023-01-25

## 2023-01-25 RX ADMIN — OXYCODONE HYDROCHLORIDE AND ACETAMINOPHEN 1 TABLET: 5; 325 TABLET ORAL at 09:06

## 2023-01-25 RX ADMIN — KETOROLAC TROMETHAMINE 15 MG: 15 INJECTION, SOLUTION INTRAMUSCULAR; INTRAVENOUS at 08:20

## 2023-01-25 RX ADMIN — KETOROLAC TROMETHAMINE 15 MG: 15 INJECTION, SOLUTION INTRAMUSCULAR; INTRAVENOUS at 08:19

## 2023-01-25 ASSESSMENT — ENCOUNTER SYMPTOMS
NAUSEA: 0
ABDOMINAL PAIN: 0
DIARRHEA: 0
VOMITING: 0
SHORTNESS OF BREATH: 0
BLOOD IN STOOL: 0

## 2023-01-25 ASSESSMENT — PAIN SCALES - GENERAL
PAINLEVEL_OUTOF10: 9
PAINLEVEL_OUTOF10: 10

## 2023-01-25 ASSESSMENT — PAIN - FUNCTIONAL ASSESSMENT: PAIN_FUNCTIONAL_ASSESSMENT: 0-10

## 2023-01-25 ASSESSMENT — PAIN DESCRIPTION - ORIENTATION: ORIENTATION: LEFT

## 2023-01-25 ASSESSMENT — PAIN DESCRIPTION - LOCATION: LOCATION: KNEE

## 2023-01-25 NOTE — ED PROVIDER NOTES
[facility]  ED  EMERGENCY DEPARTMENT ENCOUNTER      Pt Name: Tash Talley  TQZ:[id number]  [place] 1970  Date of evaluation: 1/25/23      CHIEF COMPLAINT:   Chief Complaint   Patient presents with    Knee Pain     HISTORY OF PRESENT ILLNESS    Tash Talley is a 48 y.o. male who presents with history of gout occasionally affecting the knees and believes this may be occurring on the left knee with pain beginning over the last 24 hours. Denies any injury or redness or actual swelling but \"feels like swollen on the inside\". Pain is sharp in nature moderate intensity made worse by movement and standing. Patient denies any systemic signs such as fevers or chills or nausea/vomiting/diarrhea and no chest pain or shortness of breath or abdominal pain associated either. Denies anything helping the pain other than remaining still        REVIEW OF SYSTEMS(2-9 systemsfor level 4, 10 or more for level 5)     Review of Systems   Constitutional:  Negative for chills and fever. Respiratory:  Negative for shortness of breath. Cardiovascular:  Negative for chest pain. Gastrointestinal:  Negative for abdominal pain, blood in stool, diarrhea, nausea and vomiting. Skin:  Negative for rash. Neurological:  Negative for weakness (No focal weakness. ). PASTMEDICALHISTORY   PMH:  has a past medical history of Gout. Surgical History:  has a past surgical history that includes Appendectomy and [place] tooth extraction. Social History: reports that he has been smoking cigarettes. He has a 5.75 pack-year smoking history. He has never used smokeless tobacco. He reports current alcohol use. He reports that he does not use drugs. Family History: Noncontributory at this time  Psychiatric History: Noncontributory at this time    Allergies:has No Known Allergies.       PHYSICALEXAM  (up to 7 for level 4, 8 or more for level 5)   INITIAL VITALS: BP: (!) 146/94  Heart Rate: 73  Resp: 16  Temp: 97 °F (36.1 °C) SpO2: 98 %    Physical Exam  Constitutional:       Appearance: He is well-developed. HENT:      Head: Normocephalic and atraumatic. Eyes:      Conjunctiva/sclera: Conjunctivae normal.      Pupils: Pupils are equal, round, and reactive to light. Neck:      Vascular: No JVD. Cardiovascular:      Rate and Rhythm: Normal rate and regular rhythm. Heart sounds: S1 normal and S2 normal. No murmur heard. No friction rub. No gallop. Pulmonary:      Effort: Pulmonary effort is normal. No respiratory distress. Breath sounds: Normal breath sounds. No wheezing. Abdominal:      General: Bowel sounds are normal. There is no distension. Palpations: Abdomen is soft. There is no mass (No pulsatile masses palpated). Tenderness: There is no abdominal tenderness. There is no guarding or rebound. Musculoskeletal:         General: Tenderness (There is tenderness to palpation overlying the left knee But nothing on the sides or posterior aspect) present. Cervical back: Normal range of motion and neck supple. Comments: There is no obvious swelling at this time and no erythema but perhaps slight warmth. Patient has intact range of motion at least 45 degrees but then pain underlying the kneecap occurs. No tenderness on the ACL/PCL testing and no laxity or tenderness medially or laterally. No swelling posteriorly either and no tenderness to palpation there. Strong DP/PT pulses are palpated and distal strength is 5/5 with sensation light touch intact. Skin:     General: Skin is warm and dry. Neurological:      Mental Status: He is alert and oriented to person, place, and time. Cranial Nerves: No cranial nerve deficit. Motor: No abnormal muscle tone. EMERGENCY DEPARTMENT COURSE:     Patient is having left pain in the knee without injury and consistent with possible previous gout episodes.    Will attempt Toradol for pain control as well as send basic laboratories including CBC, BMP, CRP, uric acid, and ESR. Obviously x-ray as well since this has not been done in the past.   Reevaluate after but low suspicion for septic joint given lack of erythema or obvious swelling. Likely gouty flareup consistent with patient's previous episodes. 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 No obvious effusion or bony erosion and CRP is undetectably low as well as sed rate of only 1 therefore no suspicion for septic arthritis at this time. Patient only slightly better after Toradol and 1 tablet of Percocet given with good effect. Will start on allopurinol as well and close follow-up with his physician in the next 2 to 3 days or return to the ER if worse or any concerns whatsoever      FINAL IMPRESSION:     1. Acute pain of left knee    2. Acute gout of left knee, unspecified cause          DISPOSITION:  DISPOSITION Decision To Discharge 01/25/2023 09:20:09 AM        PATIENT REFERRED TO:  [address] [place] [id number]  In 3 days  Return to the ER if worse or any concerns at all, Follow up with your doctor in the next 2-3 days    DISCHARGE MEDICATIONS:  New Prescriptions    ALLOPURINOL (ZYLOPRIM) 100 MG TABLET    Take 1 tablet by mouth daily    OXYCODONE-ACETAMINOPHEN (PERCOCET) 5-325 MG PER TABLET    Take 1 tablet by mouth every 8 hours as needed for Pain for up to 10 doses. Max Daily Amount: 3 tablets           (Please note that portions of this note were completed with a voice recognition program. Efforts were made to edit the dictations but occasionally words are mis-transcribed. Marylee Hong used in this note in any gender, they shall be construed as though they were used in the gender appropriate to the circumstances; and whenever words are used in this note in the singular or plural form, theyshallbeconstrued as though they were used in the form appropriate to the circumstances.)    Edwige Shook MD Catherine Miles  Attending Emergency Medicine Physician       Yanique Ochoa MD  01/25/23 5819

## 2023-01-25 NOTE — Clinical Note
Celine Maxwell was seen and treated in our emergency department on 1/25/2023. He may return to work on 01/28/2023. If you have any questions or concerns, please don't hesitate to call.       Yash Sheffield MD

## 2023-01-25 NOTE — ED TRIAGE NOTES
Pt presents to ED with complaints of left knee pain that started yesterday. Pt states that he has a hx of gout and has recently had issues with fluid accumulation around the knee in the past. Pt states that it is hard for him to bend the knee and rates the pain a 10/10. Pt alert and oriented. Pt able to ambulate.

## 2023-02-02 ENCOUNTER — HOSPITAL ENCOUNTER (EMERGENCY)
Age: 53
Discharge: HOME | End: 2023-02-02
Attending: EMERGENCY MEDICINE
Payer: MEDICARE

## 2023-02-02 VITALS
DIASTOLIC BLOOD PRESSURE: 81 MMHG | RESPIRATION RATE: 19 BRPM | OXYGEN SATURATION: 97 % | TEMPERATURE: 97.3 F | SYSTOLIC BLOOD PRESSURE: 153 MMHG | HEART RATE: 67 BPM

## 2023-02-02 DIAGNOSIS — M25.562 LEFT KNEE PAIN, UNSPECIFIED CHRONICITY: Primary | ICD-10-CM

## 2023-02-02 PROCEDURE — 6370000000 HC RX 637 (ALT 250 FOR IP): Performed by: STUDENT IN AN ORGANIZED HEALTH CARE EDUCATION/TRAINING PROGRAM

## 2023-02-02 PROCEDURE — 99283 EMERGENCY DEPT VISIT LOW MDM: CPT

## 2023-02-02 RX ORDER — HYDROCODONE BITARTRATE AND ACETAMINOPHEN 5; 325 MG/1; MG/1
1 TABLET ORAL ONCE
Status: COMPLETED | OUTPATIENT
Start: 2023-02-02 | End: 2023-02-02

## 2023-02-02 RX ADMIN — HYDROCODONE BITARTRATE AND ACETAMINOPHEN 1 TABLET: 5; 325 TABLET ORAL at 09:08

## 2023-02-02 RX ADMIN — DICLOFENAC 2 G: 10 GEL TOPICAL at 10:20

## 2023-02-02 ASSESSMENT — PAIN SCALES - GENERAL: PAINLEVEL_OUTOF10: 10

## 2023-02-02 ASSESSMENT — PAIN - FUNCTIONAL ASSESSMENT: PAIN_FUNCTIONAL_ASSESSMENT: 0-10

## 2023-02-02 NOTE — ED PROVIDER NOTES
[facility]  ED  Emergency Department Encounter  Emergency Medicine Resident     Pt Maritza Wang  MRN: [id number]  [place] 1970  Date of evaluation: 2/2/23  PCP:  DONNA Adam CNP  Note Started: 8:40 AM EST      CHIEF COMPLAINT       Chief Complaint   Patient presents with    Gout     Left knee, states here last week for same and given percocet and allopurinol and ran out yesterday. HISTORY OF PRESENT ILLNESS  (Location/Symptom, Timing/Onset, Context/Setting, Quality, Duration, Modifying Factors, Severity.)      Suzie Hollis is a 48 y.o. male who presents with L knee 9/10 pain, hx of gout. Able to ambulate, worsens pain. Takes allopurinol at home but ran out. Took tylenol after he ran out, didn't help. Patient was seen on 1/25 for the same issue, was given allopurinol and Norco.  Patient was given a month supply of Norco, he states that he is out of the medication. Patient has not had any fevers chills nausea vomiting dizziness redness of the constitutional symptoms. PAST MEDICAL / SURGICAL / SOCIAL / FAMILY HISTORY      has a past medical history of Gout.       has a past surgical history that includes Appendectomy and [place] tooth extraction.       Social History     Socioeconomic History    Marital status:      Spouse name: Not on file    Number of children: Not on file    Years of education: Not on file    Highest education level: Not on file   Occupational History    Not on file   Tobacco Use    Smoking status: Every Day     Packs/day: 0.25     Years: 23.00     Pack years: 5.75     Types: Cigarettes    Smokeless tobacco: Never    Tobacco comments:     quit smoking 4/1/17   Vaping Use    Vaping Use: Never used   Substance and Sexual Activity    Alcohol use: Yes     Comment: socially    Drug use: No    Sexual activity: Not on file   Other Topics Concern    Not on file   Social History Narrative    Not on file     Social Determinants of Health Financial Resource Strain: Not on file   Food Insecurity: Not on file   Transportation Needs: Not on file   Physical Activity: Not on file   Stress: Not on file   Social Connections: Not on file   Intimate Partner Violence: Not on file   Housing Stability: Not on file       Family History   Problem Relation Age of Onset    High Blood Pressure Mother     Alzheimer's Disease Father        Allergies:  Patient has no known allergies. Home Medications:  Prior to Admission medications    Medication Sig Start Date End Date Taking? Authorizing Provider   allopurinol (ZYLOPRIM) 100 MG tablet Take 1 tablet by mouth daily 1/25/23   Hollie Bolaños MD   ibuprofen (IBU) 800 MG tablet Take 1 tablet by mouth every 8 hours as needed for Pain 12/1/21   Wilberto Kat MD         REVIEW OF SYSTEMS       Review of Systems  Positive for left-sided knee pain,  Negative for chest pain shortness of breath dizziness drowsiness fevers chills nausea vomiting or other constitutional symptoms. PHYSICAL EXAM      INITIAL VITALS:   BP (!) 153/81   Pulse 67   Temp 97.3 °F (36.3 °C) (Oral)   Resp 19   SpO2 97%     Physical Exam  General: Well-developed, well-nourished, appears appropriate for documented age. HEENT: Normocephalic, atraumatic. Sclera white, pupils reactive. No cervical lymphadenopathy  Cardiovascular: Regular rate and rhythm. Pulses strong in bilateral upper and lower extremities. Capillary refill less than 2 seconds. Pulmonary: Lungs clear to auscultation bilaterally. No rales or rhonchi  Abdominal: Soft, nondistended, nontender. No palpable masses. MSK: No erythema swelling tenderness to palpation of the joint line of the left knee. Mild patellar tenderness. Reduced range of motion, patient is able to flex the knee to about 30 degrees. Patient is able to ambulate without any issues. Neuro: Reacts appropriately to external stimuli, moves all 4 extremities spontaneously.   Skin: No rashes lesions abrasions or bruises. DDX/DIAGNOSTIC RESULTS / EMERGENCY DEPARTMENT COURSE / MDM     Medical Decision Making  Risk  Prescription drug management. EMERGENCY DEPARTMENT COURSE:      ED Course as of 02/02/23 0951   Thu Feb 02, 2023   263 70-year-old male, history of gout in his left knee. Patient states his pain is still present in his left knee, is having some difficulty bending his left knee. Afebrile nontoxic-appearing no trauma to the area. Patient was seen on 25 January, about 1 week ago for the same issue. On exam patient does not have any acute findings. Hemodynamically stable, no swelling or erythema to the area. Patient was taking Norco and allopurinol for symptoms. He ran out of his medications, is requesting more. Has an appointment with his primary care doctor tomorrow. We will plan for treatment with Voltaren gel, 1 dose of Norco, discharge and outpatient follow-up. [KK]      ED Course User Index  [KK] Scooter Quach DO       FINAL IMPRESSION      1.  Left knee pain, unspecified chronicity          DISPOSITION / PLAN     DISPOSITION Decision To Discharge 02/02/2023 09:47:16 AM      PATIENT REFERRED TO:  [facility]  [address] [place] [address]  [phone]  Go to   As needed    DONNA Coe [facility] [address] [address]  [phone]    Schedule an appointment as soon as possible for a visit in 3 days      DISCHARGE MEDICATIONS:  Current Discharge Medication List          Humberto Rizvi DO  Emergency Medicine Resident    (Please note that portions of thisnote were completed with a voice recognition program.  Efforts were made to edit the dictations but occasionally words are mis-transcribed.)        Scooter Quach DO  Resident  02/02/23 2956

## 2023-02-02 NOTE — DISCHARGE INSTRUCTIONS
You have been seen in the ER today for knee pain. If you begin to experience any symptoms such as chest pain shortness of breath nausea vomiting dizziness drowsiness abdominal pain loss of consciousness or any other symptoms you find concerning please return to the ED for follow-up evaluation. If you have been given pain medication please take them only as prescribed. Do not take more medication than prescribed at any given time. Please follow-up with your primary care provider within 3-5 days for continued care, sooner if you have concerns.

## 2023-02-02 NOTE — ED PROVIDER NOTES
[facility]  Emergency Department  Faculty Attestation     I performed a history and physical examination of the patient and discussed management with the resident. I reviewed the residents note and agree with the documented findings and plan of care. Any areas of disagreement are noted on the chart. I was personally present for the key portions of any procedures. I have documented in the chart those procedures where I was not present during the key portions. I have reviewed the emergency nurses triage note. I agree with the chief complaint, past medical history, past surgical history, allergies, medications, social and family history as documented unless otherwise noted below. For Physician Assistant/ Nurse Practitioner cases/documentation I have personally evaluated this patient and have completed at least one if not all key elements of the E/M (history, physical exam, and MDM). Additional findings are as noted. Primary Care Physician:  DONNA Jaime - CNP    Screenings:  [unfilled]    CHIEF COMPLAINT       Chief Complaint   Patient presents with    Gout     Left knee, states here last week for same and given percocet and allopurinol and ran out yesterday. RECENT VITALS:   Temp: 97.3 °F (36.3 °C),  Heart Rate: 67, Resp: 19, BP: (!) 153/81    LABS:  Labs Reviewed - No data to display    Radiology  No orders to display         Attending Physician Additional  Notes      Patient has isolated pain over the left patella. He has a history of gout in his toes, on allopurinol but just ran out, recent evaluation for the same had normal imaging. He was prescribed an oral analgesic and allopurinol. He states that the pain is worse when he tries to bend or kneel. There is no redness or swelling. No trauma. No fever chills or sweats. No swelling. No calf pain chest pain shortness of breath.   On exam he is uncomfortable elevated pain score vital signs otherwise normal with exception of mild hypertension. Left patella has slight tenderness. No erythema or warmth. No effusion. No laxity. No limitation in movement. Impression is possible gout versus prepatellar bursitis, no evidence of infectious process. Plan is analgesics, topical NSAIDs, work note, return precautions, follow-up. Luis Fisher.  Elizabet Blake MD, [facility]  Attending Emergency  Physician                Elizabeth Ford MD  02/02/23 9784

## 2023-02-11 ENCOUNTER — APPOINTMENT (OUTPATIENT)
Dept: CT IMAGING | Age: 53
End: 2023-02-11
Payer: MEDICARE

## 2023-02-11 ENCOUNTER — HOSPITAL ENCOUNTER (EMERGENCY)
Age: 53
Discharge: HOME | End: 2023-02-11
Attending: EMERGENCY MEDICINE
Payer: MEDICARE

## 2023-02-11 ENCOUNTER — APPOINTMENT (OUTPATIENT)
Dept: GENERAL RADIOLOGY | Age: 53
End: 2023-02-11
Payer: MEDICARE

## 2023-02-11 VITALS
DIASTOLIC BLOOD PRESSURE: 86 MMHG | OXYGEN SATURATION: 99 % | BODY MASS INDEX: 23.63 KG/M2 | SYSTOLIC BLOOD PRESSURE: 161 MMHG | HEIGHT: 66 IN | HEART RATE: 70 BPM | TEMPERATURE: 98.4 F | WEIGHT: 147 LBS | RESPIRATION RATE: 17 BRPM

## 2023-02-11 DIAGNOSIS — R10.13 ABDOMINAL PAIN, EPIGASTRIC: Primary | ICD-10-CM

## 2023-02-11 DIAGNOSIS — K57.32 DIVERTICULITIS OF COLON: ICD-10-CM

## 2023-02-11 LAB
ABSOLUTE EOS #: 0.23 K/UL (ref 0–0.44)
ABSOLUTE IMMATURE GRANULOCYTE: 0.05 K/UL (ref 0–0.3)
ABSOLUTE LYMPH #: 3.04 K/UL (ref 1.1–3.7)
ABSOLUTE MONO #: 0.84 K/UL (ref 0.1–1.2)
ALBUMIN SERPL-MCNC: 4.3 G/DL (ref 3.5–5.2)
ALBUMIN/GLOBULIN RATIO: 1.5 (ref 1–2.5)
ALP SERPL-CCNC: 104 U/L (ref 40–129)
ALT SERPL-CCNC: 20 U/L (ref 5–41)
ANION GAP SERPL CALCULATED.3IONS-SCNC: 10 MMOL/L (ref 9–17)
AST SERPL-CCNC: 35 U/L
BASOPHILS # BLD: 1 % (ref 0–2)
BASOPHILS ABSOLUTE: 0.08 K/UL (ref 0–0.2)
BILIRUB SERPL-MCNC: 0.4 MG/DL (ref 0.3–1.2)
BUN SERPL-MCNC: 14 MG/DL (ref 6–20)
CALCIUM SERPL-MCNC: 9.2 MG/DL (ref 8.6–10.4)
CHLORIDE SERPL-SCNC: 105 MMOL/L (ref 98–107)
CO2 SERPL-SCNC: 24 MMOL/L (ref 20–31)
CREAT SERPL-MCNC: 0.84 MG/DL (ref 0.7–1.2)
EOSINOPHILS RELATIVE PERCENT: 2 % (ref 1–4)
GFR SERPL CREATININE-BSD FRML MDRD: >60 ML/MIN/1.73M2
GLUCOSE SERPL-MCNC: 100 MG/DL (ref 70–99)
HCT VFR BLD AUTO: 37.7 % (ref 40.7–50.3)
HGB BLD-MCNC: 12.8 G/DL (ref 13–17)
IMMATURE GRANULOCYTES: 1 %
LIPASE SERPL-CCNC: 29 U/L (ref 13–60)
LYMPHOCYTES # BLD: 27 % (ref 24–43)
MCH RBC QN AUTO: 32.2 PG (ref 25.2–33.5)
MCHC RBC AUTO-ENTMCNC: 34 G/DL (ref 28.4–34.8)
MCV RBC AUTO: 94.7 FL (ref 82.6–102.9)
MONOCYTES # BLD: 8 % (ref 3–12)
NRBC AUTOMATED: 0 PER 100 WBC
PDW BLD-RTO: 12.3 % (ref 11.8–14.4)
PLATELET # BLD AUTO: 300 K/UL (ref 138–453)
PMV BLD AUTO: 10 FL (ref 8.1–13.5)
POTASSIUM SERPL-SCNC: 4 MMOL/L (ref 3.7–5.3)
PROT SERPL-MCNC: 7.1 G/DL (ref 6.4–8.3)
RBC # BLD: 3.98 M/UL (ref 4.21–5.77)
SEG NEUTROPHILS: 61 % (ref 36–65)
SEGMENTED NEUTROPHILS ABSOLUTE COUNT: 6.84 K/UL (ref 1.5–8.1)
SODIUM SERPL-SCNC: 139 MMOL/L (ref 135–144)
TROPONIN I SERPL DL<=0.01 NG/ML-MCNC: 7 NG/L (ref 0–22)
TROPONIN I SERPL DL<=0.01 NG/ML-MCNC: <6 NG/L (ref 0–22)
WBC # BLD AUTO: 11.1 K/UL (ref 3.5–11.3)

## 2023-02-11 PROCEDURE — 74177 CT ABD & PELVIS W/CONTRAST: CPT

## 2023-02-11 PROCEDURE — 71045 X-RAY EXAM CHEST 1 VIEW: CPT

## 2023-02-11 PROCEDURE — 2500000003 HC RX 250 WO HCPCS

## 2023-02-11 PROCEDURE — 6370000000 HC RX 637 (ALT 250 FOR IP): Performed by: STUDENT IN AN ORGANIZED HEALTH CARE EDUCATION/TRAINING PROGRAM

## 2023-02-11 PROCEDURE — 80053 COMPREHEN METABOLIC PANEL: CPT

## 2023-02-11 PROCEDURE — 83690 ASSAY OF LIPASE: CPT

## 2023-02-11 PROCEDURE — 96374 THER/PROPH/DIAG INJ IV PUSH: CPT

## 2023-02-11 PROCEDURE — 84484 ASSAY OF TROPONIN QUANT: CPT

## 2023-02-11 PROCEDURE — 93005 ELECTROCARDIOGRAM TRACING: CPT | Performed by: STUDENT IN AN ORGANIZED HEALTH CARE EDUCATION/TRAINING PROGRAM

## 2023-02-11 PROCEDURE — 99285 EMERGENCY DEPT VISIT HI MDM: CPT

## 2023-02-11 PROCEDURE — 96375 TX/PRO/DX INJ NEW DRUG ADDON: CPT

## 2023-02-11 PROCEDURE — 6360000002 HC RX W HCPCS: Performed by: STUDENT IN AN ORGANIZED HEALTH CARE EDUCATION/TRAINING PROGRAM

## 2023-02-11 PROCEDURE — 85025 COMPLETE CBC W/AUTO DIFF WBC: CPT

## 2023-02-11 PROCEDURE — 6360000004 HC RX CONTRAST MEDICATION: Performed by: STUDENT IN AN ORGANIZED HEALTH CARE EDUCATION/TRAINING PROGRAM

## 2023-02-11 RX ORDER — METRONIDAZOLE 500 MG/1
500 TABLET ORAL ONCE
Status: COMPLETED | OUTPATIENT
Start: 2023-02-11 | End: 2023-02-11

## 2023-02-11 RX ORDER — FAMOTIDINE 10 MG/ML
20 INJECTION, SOLUTION INTRAVENOUS ONCE
Status: COMPLETED | OUTPATIENT
Start: 2023-02-11 | End: 2023-02-11

## 2023-02-11 RX ORDER — CIPROFLOXACIN 500 MG/1
500 TABLET, FILM COATED ORAL ONCE
Status: COMPLETED | OUTPATIENT
Start: 2023-02-11 | End: 2023-02-11

## 2023-02-11 RX ORDER — FENTANYL CITRATE 50 UG/ML
50 INJECTION, SOLUTION INTRAMUSCULAR; INTRAVENOUS ONCE
Status: COMPLETED | OUTPATIENT
Start: 2023-02-11 | End: 2023-02-11

## 2023-02-11 RX ORDER — DICYCLOMINE HYDROCHLORIDE 10 MG/1
10 CAPSULE ORAL ONCE
Status: COMPLETED | OUTPATIENT
Start: 2023-02-11 | End: 2023-02-11

## 2023-02-11 RX ORDER — OXYCODONE HYDROCHLORIDE 5 MG/1
5 TABLET ORAL EVERY 8 HOURS PRN
Qty: 6 TABLET | Refills: 0 | Status: SHIPPED | OUTPATIENT
Start: 2023-02-11 | End: 2023-02-14

## 2023-02-11 RX ORDER — FAMOTIDINE 10 MG/ML
INJECTION, SOLUTION INTRAVENOUS
Status: COMPLETED
Start: 2023-02-11 | End: 2023-02-11

## 2023-02-11 RX ORDER — METRONIDAZOLE 500 MG/1
500 TABLET ORAL 3 TIMES DAILY
Qty: 5 TABLET | Refills: 0 | Status: SHIPPED | OUTPATIENT
Start: 2023-02-11

## 2023-02-11 RX ORDER — CIPROFLOXACIN 500 MG/1
500 TABLET, FILM COATED ORAL 2 TIMES DAILY
Qty: 10 TABLET | Refills: 0 | Status: SHIPPED | OUTPATIENT
Start: 2023-02-11 | End: 2023-02-16

## 2023-02-11 RX ORDER — MAGNESIUM HYDROXIDE/ALUMINUM HYDROXICE/SIMETHICONE 120; 1200; 1200 MG/30ML; MG/30ML; MG/30ML
30 SUSPENSION ORAL ONCE
Status: COMPLETED | OUTPATIENT
Start: 2023-02-11 | End: 2023-02-11

## 2023-02-11 RX ADMIN — FAMOTIDINE 20 MG: 10 INJECTION, SOLUTION INTRAVENOUS at 20:34

## 2023-02-11 RX ADMIN — CIPROFLOXACIN 500 MG: 500 TABLET, FILM COATED ORAL at 23:15

## 2023-02-11 RX ADMIN — IOPAMIDOL 75 ML: 755 INJECTION, SOLUTION INTRAVENOUS at 22:30

## 2023-02-11 RX ADMIN — FENTANYL CITRATE 50 MCG: 50 INJECTION, SOLUTION INTRAMUSCULAR; INTRAVENOUS at 22:04

## 2023-02-11 RX ADMIN — METRONIDAZOLE 500 MG: 500 TABLET ORAL at 23:15

## 2023-02-11 RX ADMIN — DICYCLOMINE HYDROCHLORIDE 10 MG: 10 CAPSULE ORAL at 20:58

## 2023-02-11 RX ADMIN — ALUMINUM HYDROXIDE, MAGNESIUM HYDROXIDE, AND SIMETHICONE 30 ML: 200; 200; 20 SUSPENSION ORAL at 20:58

## 2023-02-11 ASSESSMENT — PAIN DESCRIPTION - LOCATION: LOCATION: ABDOMEN

## 2023-02-11 ASSESSMENT — ENCOUNTER SYMPTOMS
SHORTNESS OF BREATH: 0
CONSTIPATION: 0
EYE PAIN: 0
ABDOMINAL PAIN: 1
COLOR CHANGE: 0
CHEST TIGHTNESS: 0
SINUS PRESSURE: 0
VOMITING: 0
EYE REDNESS: 0
NAUSEA: 0
ABDOMINAL DISTENTION: 0
COUGH: 0
DIARRHEA: 0
PHOTOPHOBIA: 0
SORE THROAT: 0
RHINORRHEA: 0

## 2023-02-11 ASSESSMENT — PAIN SCALES - GENERAL
PAINLEVEL_OUTOF10: 7
PAINLEVEL_OUTOF10: 8
PAINLEVEL_OUTOF10: 7

## 2023-02-11 ASSESSMENT — PAIN - FUNCTIONAL ASSESSMENT: PAIN_FUNCTIONAL_ASSESSMENT: 0-10

## 2023-02-11 ASSESSMENT — PAIN DESCRIPTION - DESCRIPTORS: DESCRIPTORS: ACHING;DISCOMFORT

## 2023-02-11 ASSESSMENT — HEART SCORE: ECG: 0

## 2023-02-12 LAB
EKG ATRIAL RATE: 60 BPM
EKG P AXIS: 37 DEGREES
EKG P-R INTERVAL: 186 MS
EKG Q-T INTERVAL: 358 MS
EKG QRS DURATION: 90 MS
EKG QTC CALCULATION (BAZETT): 358 MS
EKG R AXIS: 7 DEGREES
EKG T AXIS: 14 DEGREES
EKG VENTRICULAR RATE: 60 BPM

## 2023-02-12 NOTE — ED NOTES
Pt reports to the ED with complaints of abdominal pain x3 days now. Pt reports eating chicken and rice Wednesday around 2300 and woke up the next day with pain. Pt denies N/V/D, stating his BM's have been at baseline. Pt denies dark or bloody stools. Pt reports only drinking fluids since Thursday. Pt denies taking anything for pain PTA. Pt reports drinking alcohol socially. Pt is hypertensive. Pt does not have any other complaints at this time. Pt is A&O x4 and speaking in complete sentences. Pt is resting in bed comfortably, NAD noted. Pt denies chest pain, SOB, urinary symptoms.  Will continue to monitor       [facility]  02/11/23 0861         Nasreen Kimball RN  02/11/23 6523

## 2023-02-12 NOTE — ED NOTES
The following labs were labeled with appropriate pt sticker and tubed to lab:     [] Blue     [] Lavender   [] on ice  [x] Green/yellow  [] Green/black [] on ice  [] Tolley Schilder  [] on ice  [] Yellow  [] Red  [] Type/ Screen  [] ABG  [] VBG    [] COVID-19 swab    [] Rapid  [] PCR  [] Flu swab  [] Peds Viral Panel     [] Urine Sample  [] Fecal Sample  [] Pelvic Cultures  [] Blood Cultures  [] X 2  [] STREP Cultures         Perry De La Cruz, RN  02/11/23 6863

## 2023-02-12 NOTE — ED PROVIDER NOTES
[facility]     Emergency Department     Faculty Attestation        I performed a history and physical examination of the patient and discussed management with the resident. I reviewed the residents note and agree with the documented findings and plan of care. Any areas of disagreement are noted on the chart. I was personally present for the key portions of any procedures. I have documented in the chart those procedures where I was not present during the key portions. I have reviewed the emergency nurses triage note. I agree with the chief complaint, past medical history, past surgical history, allergies, medications, social and family history as documented unless otherwise noted below. For mid-level providers such as nurse practitioners as well as physicians assistants:    I have personally seen and evaluated the patient. I find the patient's history and physical exam are consistent with NP/PA documentation. I agree with the care provided, treatment rendered, disposition, & follow-up plan. Additional findings are as noted.     Vital Signs: BP (!) 161/86   Pulse 81   Temp 98.4 °F (36.9 °C) (Oral)   Resp 16   Ht 5' 6\" (1.676 m)   Wt 147 lb (66.7 kg)   SpO2 98%   BMI 23.73 kg/m²   PCP:  Stacey Valdez, DONNA - CNP    Pertinent Comments:           Critical Care  None          James Meier MD    Attending Emergency Medicine Physician            Molly Mcneil MD  02/11/23 2024

## 2023-02-12 NOTE — ED PROVIDER NOTES
[facility]  Emergency Department  Emergency Medicine Resident Sign-out     Care of Aayush Leyva was assumed from Dr. Billy Harry and is being seen for Abdominal Pain (Pt stated he ate something bad. Pt stated he's been experiencing pain since Wednesday. )  . The patient's initial evaluation and plan have been discussed with the prior provider who initially evaluated the patient. EMERGENCY DEPARTMENT COURSE / MEDICAL DECISION MAKING:       MEDICATIONS GIVEN:  Orders Placed This Encounter   Medications    famotidine (PEPCID) injection 20 mg    famotidine (PEPCID) 20 MG/2ML injection     Virgie Adams M: cabinet override    aluminum & magnesium hydroxide-simethicone (MAALOX) 200-200-20 MG/5ML suspension 30 mL    dicyclomine (BENTYL) capsule 10 mg    fentaNYL (SUBLIMAZE) injection 50 mcg    iopamidol (ISOVUE-370) 76 % injection 75 mL    metroNIDAZOLE (FLAGYL) tablet 500 mg     Order Specific Question:   Antimicrobial Indications     Answer:   Intra-Abdominal Infection    ciprofloxacin (CIPRO) tablet 500 mg     Order Specific Question:   Antimicrobial Indications     Answer:   Intra-Abdominal Infection    metroNIDAZOLE (FLAGYL) 500 MG tablet     Sig: Take 1 tablet by mouth 3 times daily Take with Food. Do NOT drink alcohol. Dispense:  5 tablet     Refill:  0    ciprofloxacin (CIPRO) 500 MG tablet     Sig: Take 1 tablet by mouth 2 times daily for 5 days     Dispense:  10 tablet     Refill:  0    oxyCODONE (ROXICODONE) 5 MG immediate release tablet     Sig: Take 1 tablet by mouth every 8 hours as needed for Pain for up to 3 days. Intended supply: 3 days.  Take lowest dose possible to manage pain Max Daily Amount: 15 mg     Dispense:  6 tablet     Refill:  0       LABS / RADIOLOGY:     Labs Reviewed   CBC WITH AUTO DIFFERENTIAL - Abnormal; Notable for the following components:       Result Value    RBC 3.98 (*)     Hemoglobin 12.8 (*)     Hematocrit 37.7 (*)     Immature Granulocytes 1 (*) All other components within normal limits   COMPREHENSIVE METABOLIC PANEL W/ REFLEX TO MG FOR LOW K - Abnormal; Notable for the following components:    Glucose 100 (*)     All other components within normal limits   LIPASE   TROPONIN   TROPONIN       XR KNEE LEFT (1-2 VIEWS)    Result Date: 1/25/2023  EXAMINATION: 1 X-RAY VIEW OF THE LEFT KNEE 1/25/2023 8:02 am COMPARISON: 01/25/2023 HISTORY: ORDERING SYSTEM PROVIDED HISTORY: [place] view please TECHNOLOGIST PROVIDED HISTORY: [place] view please FINDINGS: Single sunrise view of the left knee was obtained. Soft tissues are within normal limits. The joint space is preserved. There is mild spurring at the lateral aspect of the patella, possibly related to bipartite patella. No acute fracture. No acute osseous abnormality on single sunrise view of the left knee. Mild spurring at the lateral patella may be related to the bipartite segment. XR KNEE LEFT (3 VIEWS)    Result Date: 1/25/2023  EXAMINATION: THREE XRAY VIEWS OF THE LEFT KNEE 1/25/2023 7:28 am COMPARISON: None. HISTORY: ORDERING SYSTEM PROVIDED HISTORY: Pain TECHNOLOGIST PROVIDED HISTORY: Pain FINDINGS: No convincing evidence of acute fracture or dislocation. Curvilinear lucency superolateral aspect of the patella has an appearance suggesting bipartite patella. Bone mineralization and alignment appear intact. There may be slight medial joint space loss. Joint spaces otherwise unremarkable. There may be a tiny suprapatellar joint effusion. No definite erosive changes are seen. No unusual periarticular calcifications. No acute osseous abnormality. Bipartite patella. RECENT VITALS:     Temp: 98.4 °F (36.9 °C),  Heart Rate: 70, Resp: 17, BP: (!) 161/86, SpO2: 99 %      This patient is a 48 y.o. Male with chest pain/midepigastric pain. Pain not relieved with GI cocktail. CT abdomen ordered to r/o intraabdominal pathology.         ED Course as of 02/11/23 2318   Sat Feb 11, 2023 2045 Reassessed. States he is still having the midepigastric burning sensation after Pepcid use. We will try Maalox and Bentyl. This does not alleviate symptoms will obtain CT abdomen pelvis. Cardiac work-up is negative, no concern for pancreatitis he has no white count all his electrolytes are within normal limits. [QC]   2059 Pt signed out to Dr. Babar Houston [QC]   2308 Patient CT scan shows Acute inflammatory changes involving the ascending colon in an area of  diverticular disease. This is likely acute diverticulitis without rupture or abscess at this time. No evidence of cholecystitis. No evidence of renal stones or obstructive uropathy. Pt to be started on Cipro and Flagyl. Patient updated on the results. I did give him strict return precaution to return if he does have worsening abdominal pain even with taking the antibiotics or he develops nausea and vomiting and develops fever. Patient expressed understanding. [AN]      ED Course User Index  [AN] Roma Morales MD  [QC] Grayson Murphy MD       OUTSTANDING TASKS / RECOMMENDATIONS:    Labs,   CT abdomen     FINAL IMPRESSION:     1. Abdominal pain, epigastric    2. Diverticulitis of colon        DISPOSITION:         DISPOSITION:  [x]  Discharge   []  Transfer -    []  Admission -     []  Against Medical Advice   []  Eloped   FOLLOW-UP: [facility]  [address] [facility]  [phone]    If symptoms worsen    DONNA Vazquez [address] [facility]  [phone]    On 2/15/2023     DISCHARGE MEDICATIONS: New Prescriptions    CIPROFLOXACIN (CIPRO) 500 MG TABLET    Take 1 tablet by mouth 2 times daily for 5 days    METRONIDAZOLE (FLAGYL) 500 MG TABLET    Take 1 tablet by mouth 3 times daily Take with Food. Do NOT drink alcohol. OXYCODONE (ROXICODONE) 5 MG IMMEDIATE RELEASE TABLET    Take 1 tablet by mouth every 8 hours as needed for Pain for up to 3 days. Intended supply: 3 days.  Take lowest dose possible to manage pain Max Daily Amount: 15 mg          Maximino Perez MD  Emergency Medicine Resident  [address] [facility]       Maximino Perez MD  Resident  02/11/23 1457

## 2023-02-12 NOTE — ED PROVIDER NOTES
Faculty Sign-Out Attestation  Handoff taken on the following patient from prior Attending Physician: Damien Hillman    I was available and discussed any additional care issues that arose and coordinated the management plans with the resident(s) caring for the patient during my duty period. Any areas of disagreement with residents documentation of care or procedures are noted on the chart. I was personally present for the key portions of any/all procedures during my duty period. I have documented in the chart those procedures where I was not present during the key portions.     Abdominal pain, ct pending,   If negative may discharge    Ct inflammation ascending colon, > diverticulitis,      -s/s improved, pt given strict return instruction,   Antibiotic & discharged, [address], DO  02/12/23 0146

## 2023-02-12 NOTE — DISCHARGE INSTRUCTIONS
You were found to have diverticulitis on your CT scan. You will be started on Cipro and Flagyl for treatment. He also be given oxycodone for breakthrough pains. Please continue take your antibiotic prescription as given. If it anytime you start vomiting, you develop fever, your abdominal pain is worsening, there is chest pain or shortness of breath, please come back to the emergency room as soon as possible. Your diet needs to be transition to clear liquid diet and then advance your diet as you are able to to tolerate    CT ABDOMEN PELVIS W IV CONTRAST Additional Contrast? None   Final Result   Acute inflammatory changes involving the ascending colon in an area of   diverticular disease. This is likely acute diverticulitis without rupture or   abscess at this time. No evidence of cholecystitis. No evidence of renal stones or obstructive   uropathy. XR CHEST PORTABLE   Final Result   No acute findings.

## 2023-02-12 NOTE — ED PROVIDER NOTES
[facility] ED  Emergency Department Encounter  Emergency Medicine Resident     Pt Dior Clark  MRN: [id number]  [place] 1970  Date of evaluation: 2/11/23  PCP:  DONNA Hankins CNP  Note Started: 7:44 PM EST      CHIEF COMPLAINT       Chief Complaint   Patient presents with    Abdominal Pain     Pt stated he ate something bad. Pt stated he's been experiencing pain since Wednesday. HISTORY OF PRESENT ILLNESS  (Location/Symptom, Timing/Onset, Context/Setting, Quality, Duration, Modifying Factors, Severity.)      Kayla Eagle is a 48 y.o. male who presents with periumbilical to epigastric abdominal pain this has been ongoing since Wednesday. He describes the pain as a \"burning sensation\" that does not radiate anywhere does not go straight to his back. Denying any diaphoresis no nausea or vomiting he is having regular bowel movements and denies no episodes of diarrhea constipation hematemesis or hematochezia. Sanches Elier He did not try to take anything for this to relieve the pain. He states that he ate chicken and rice on Wednesday and woke up Thursday morning with this pain. He does drink alcohol approximately 2-3 beers a day. He is a tobacco smoker  Prior appendectomy, no other abdominal surgeries. PAST MEDICAL / SURGICAL / SOCIAL / FAMILY HISTORY      has a past medical history of Gout.       has a past surgical history that includes Appendectomy and [place] tooth extraction.       Social History     Socioeconomic History    Marital status:      Spouse name: Not on file    Number of children: Not on file    Years of education: Not on file    Highest education level: Not on file   Occupational History    Not on file   Tobacco Use    Smoking status: Every Day     Packs/day: 0.25     Years: 23.00     Pack years: 5.75     Types: Cigarettes    Smokeless tobacco: Never    Tobacco comments:     quit smoking 4/1/17   Vaping Use    Vaping Use: Never used   Substance and Sexual Activity    Alcohol use: Yes     Comment: socially    Drug use: No    Sexual activity: Not on file   Other Topics Concern    Not on file   Social History Narrative    Not on file     Social Determinants of Health     Financial Resource Strain: Not on file   Food Insecurity: Not on file   Transportation Needs: Not on file   Physical Activity: Not on file   Stress: Not on file   Social Connections: Not on file   Intimate Partner Violence: Not on file   Housing Stability: Not on file       Family History   Problem Relation Age of Onset    High Blood Pressure Mother     Alzheimer's Disease Father        Allergies:  Patient has no known allergies. Home Medications:  Prior to Admission medications    Medication Sig Start Date End Date Taking? Authorizing Provider   allopurinol (ZYLOPRIM) 100 MG tablet Take 1 tablet by mouth daily 1/25/23   Álvaro Witt MD   ibuprofen (IBU) 800 MG tablet Take 1 tablet by mouth every 8 hours as needed for Pain 12/1/21   David Ledbetter MD         REVIEW OF SYSTEMS       Review of Systems   Constitutional:  Negative for activity change, chills, diaphoresis, fatigue and fever. HENT:  Negative for congestion, rhinorrhea, sinus pressure and sore throat. Eyes:  Negative for photophobia, pain and redness. Respiratory:  Negative for cough, chest tightness and shortness of breath. Cardiovascular:  Negative for chest pain and leg swelling. Gastrointestinal:  Positive for abdominal pain. Negative for abdominal distention, constipation, diarrhea, nausea and vomiting. Genitourinary:  Negative for dysuria, flank pain, frequency and urgency. Musculoskeletal:  Negative for arthralgias, myalgias and neck stiffness. Skin:  Negative for color change, pallor and rash. Neurological:  Negative for dizziness, syncope, weakness, light-headedness, numbness and headaches.      PHYSICAL EXAM      INITIAL VITALS:   BP (!) 161/86   Pulse 81   Temp 98.4 °F (36.9 °C) (Oral)   Resp 16   Ht 5' 6\" (1.676 m)   Wt 147 lb (66.7 kg)   SpO2 98%   BMI 23.73 kg/m²     Physical Exam  Constitutional:       Appearance: He is obese. He is not ill-appearing. HENT:      Mouth/Throat:      Mouth: Mucous membranes are moist.      Pharynx: Oropharynx is clear. Eyes:      Extraocular Movements: Extraocular movements intact. Pupils: Pupils are equal, round, and reactive to light. Cardiovascular:      Rate and Rhythm: Normal rate and regular rhythm. Pulmonary:      Effort: Pulmonary effort is normal.      Breath sounds: Normal breath sounds. Abdominal:      General: Abdomen is protuberant. A surgical scar is present. Bowel sounds are normal. There is distension. Palpations: Abdomen is soft. There is no fluid wave, mass or pulsatile mass. Tenderness: There is abdominal tenderness in the epigastric area and periumbilical area. There is no right CVA tenderness or left CVA tenderness. Hernia: No hernia is present. Skin:     General: Skin is warm and dry. Capillary Refill: Capillary refill takes less than 2 seconds. Neurological:      General: No focal deficit present. Mental Status: He is alert and oriented to person, place, and time. DDX/DIAGNOSTIC RESULTS / EMERGENCY DEPARTMENT COURSE / MDM     Medical Decision Making  63-year-old male with a history of gout presenting with midepigastric to periumbilical abdominal pain that began approximately 3 days ago. He ate chicken and rice on Wednesday and woke up with this abdominal pain. However is not associated with any nausea or vomiting no diarrhea no constipation no hematemesis or hematochezia. There is a  concern for food poisoning however he does not have any nausea or vomiting or diarrhea that would explain this. He does have risk factors for ACS with tobacco smoking, obesity, will obtain cardiac work-up as this could be atypical chest pain, although suspicion is low.   We will also check abdominal labs for hepatitis or pancreatitis. Low concern for appendicitis as he had an appendectomy prior. 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    EMERGENCY DEPARTMENT COURSE:      ED Course as of 02/11/23 2116   Sat Feb 11, 2023 2045 Reassessed. States he is still having the midepigastric burning sensation after Pepcid use. We will try Maalox and Bentyl. This does not alleviate symptoms will obtain CT abdomen pelvis. Cardiac work-up is negative, no concern for pancreatitis he has no white count all his electrolytes are within normal limits. [QC]   2059 Pt signed out to Dr. Laura Robles [QC]      ED Course User Index  [QC] Krystle Mercer MD       PROCEDURES:  None    CONSULTS:  None    CRITICAL CARE:  There was significant risk of life threatening deterioration of patient's condition requiring my direct management. Critical care time 0 minutes, excluding any documented procedures. FINAL IMPRESSION      1. Abdominal pain, epigastric          DISPOSITION / PLAN     DISPOSITION        PATIENT REFERRED TO:  No follow-up provider specified.     DISCHARGE MEDICATIONS:  New Prescriptions    No medications on file       Krystle Mercer MD  Emergency Medicine Resident    (Please note that portions of thisnote were completed with a voice recognition program.  Efforts were made to edit the dictations but occasionally words are mis-transcribed.)        Krystle Mercer MD  Resident  02/11/23 2116

## 2023-02-12 NOTE — ED NOTES
The following labs were labeled with appropriate pt sticker and tubed to lab:     [x] Blue     [x] Lavender   [] on ice  [x] Green/yellow  [] Green/black [] on ice  [] Jessica Giuseppe  [] on ice  [] Yellow  [x] Red  [] Type/ Screen  [] ABG  [] VBG    [] COVID-19 swab    [] Rapid  [] PCR  [] Flu swab  [] Peds Viral Panel     [] Urine Sample  [] Fecal Sample  [] Pelvic Cultures  [] Blood Cultures  [] X 2  [] STREP Cultures         Teagan Rhodes, RN  02/11/23 2741

## 2023-02-13 PROCEDURE — 93010 ELECTROCARDIOGRAM REPORT: CPT | Performed by: INTERNAL MEDICINE

## 2023-02-21 ENCOUNTER — HOSPITAL ENCOUNTER (EMERGENCY)
Age: 53
Discharge: HOME | End: 2023-02-21
Attending: EMERGENCY MEDICINE
Payer: MEDICARE

## 2023-02-21 ENCOUNTER — APPOINTMENT (OUTPATIENT)
Dept: GENERAL RADIOLOGY | Age: 53
End: 2023-02-21
Payer: MEDICARE

## 2023-02-21 VITALS
BODY MASS INDEX: 27.97 KG/M2 | HEIGHT: 66 IN | TEMPERATURE: 98.3 F | WEIGHT: 174 LBS | OXYGEN SATURATION: 96 % | HEART RATE: 83 BPM | SYSTOLIC BLOOD PRESSURE: 146 MMHG | RESPIRATION RATE: 20 BRPM | DIASTOLIC BLOOD PRESSURE: 78 MMHG

## 2023-02-21 DIAGNOSIS — M79.674 PAIN OF RIGHT GREAT TOE: Primary | ICD-10-CM

## 2023-02-21 PROCEDURE — 6370000000 HC RX 637 (ALT 250 FOR IP): Performed by: EMERGENCY MEDICINE

## 2023-02-21 PROCEDURE — 73630 X-RAY EXAM OF FOOT: CPT

## 2023-02-21 PROCEDURE — 99283 EMERGENCY DEPT VISIT LOW MDM: CPT

## 2023-02-21 RX ORDER — KETOROLAC TROMETHAMINE 30 MG/ML
30 INJECTION, SOLUTION INTRAMUSCULAR; INTRAVENOUS ONCE
Status: DISCONTINUED | OUTPATIENT
Start: 2023-02-21 | End: 2023-02-21

## 2023-02-21 RX ORDER — MELOXICAM 7.5 MG/1
7.5 TABLET ORAL DAILY PRN
Qty: 14 TABLET | Refills: 0 | Status: SHIPPED | OUTPATIENT
Start: 2023-02-21

## 2023-02-21 RX ORDER — ACETAMINOPHEN 500 MG
1000 TABLET ORAL ONCE
Status: COMPLETED | OUTPATIENT
Start: 2023-02-21 | End: 2023-02-21

## 2023-02-21 RX ORDER — IBUPROFEN 400 MG/1
400 TABLET ORAL ONCE
Status: COMPLETED | OUTPATIENT
Start: 2023-02-21 | End: 2023-02-21

## 2023-02-21 RX ADMIN — ACETAMINOPHEN 1000 MG: 500 TABLET ORAL at 08:41

## 2023-02-21 RX ADMIN — IBUPROFEN 400 MG: 400 TABLET, FILM COATED ORAL at 08:41

## 2023-02-21 ASSESSMENT — LIFESTYLE VARIABLES
HOW MANY STANDARD DRINKS CONTAINING ALCOHOL DO YOU HAVE ON A TYPICAL DAY: PATIENT DOES NOT DRINK
HOW OFTEN DO YOU HAVE A DRINK CONTAINING ALCOHOL: NEVER

## 2023-02-21 ASSESSMENT — PAIN - FUNCTIONAL ASSESSMENT: PAIN_FUNCTIONAL_ASSESSMENT: 0-10

## 2023-02-21 ASSESSMENT — PAIN DESCRIPTION - DESCRIPTORS: DESCRIPTORS: ACHING

## 2023-02-21 ASSESSMENT — PAIN DESCRIPTION - ORIENTATION: ORIENTATION: RIGHT

## 2023-02-21 ASSESSMENT — PAIN DESCRIPTION - LOCATION: LOCATION: TOE (COMMENT WHICH ONE)

## 2023-02-21 ASSESSMENT — PAIN SCALES - GENERAL: PAINLEVEL_OUTOF10: 9

## 2023-02-21 NOTE — Clinical Note
Aurora Ayala was seen and treated in our emergency department on 2/21/2023. He may return to work on 02/22/2023. Activity as tolerated. This patient's care may require future appointments. If you have any questions or concerns, please don't hesitate to call.       Evelin Smith, DO

## 2023-02-21 NOTE — ED PROVIDER NOTES
[facility]     Emergency Department     Faculty Attestation    I performed a history and physical examination of the patient and discussed management with the resident. I have reviewed and agree with the residents findings including all diagnostic interpretations, and treatment plans as written at the time of my review. Any areas of disagreement are noted on the chart. I was personally present for the key portions of any procedures. I have documented in the chart those procedures where I was not present during the key portions. For Physician Assistant/ Nurse Practitioner cases/documentation I have personally evaluated this patient and have completed at least one if not all key elements of the E/M (history, physical exam, and MDM). Additional findings are as noted. Primary Care Physician: DONNA Galvez CNP    Note Started: 8:32 AM EST    History: This is a 48 y.o. male who presents to the Emergency Department with complaint of toe pain. Patient presents emergency room complaint of right toe pain that began yesterday. Denies any trauma. Is patient states he recently had gout in the right knee and thinks this may be another flareup of his gout. He has not taken any analgesia at home. Physical:   height is 5' 6\" (1.676 m) and weight is 174 lb (78.9 kg). His oral temperature is 98.3 °F (36.8 °C). His blood pressure is 146/78 (abnormal) and his pulse is 83. His respiration is 20 and oxygen saturation is 96%. There is tenderness to the right great toe. There is no erythema or warmth noted. Impression: Right great toe pain    Plan: Analgesia, x-ray      Medical Decision Making  Amount and/or Complexity of Data Reviewed  Radiology: ordered. Decision-making details documented in ED Course. Risk  OTC drugs. Prescription drug management.         (Please note that portions of this note were completed with a voice recognition program. Efforts were made to edit the dictations but occasionally words are mis-transcribed.)    Santiago Fay.  Juli Bruce MD, [facility]  Attending Emergency Medicine Physician        Lolly Nagy MD  02/21/23 4408

## 2023-02-21 NOTE — ED TRIAGE NOTES
Pt presents to the ER with right great toe pain. Pt states he has a history of gout in his right knee and after walking a lot at work yesterday his right great toe began to ache. Toe is noticeably swollen, no redness noted this morning. Pt states its more warm than the other toe.

## 2023-02-21 NOTE — ED PROVIDER NOTES
[facility]  ED  Emergency Department Encounter  Emergency Medicine Resident     Pt Sis Man  MRN: [id number]  [place] 1970  Date of evaluation: 2/21/23  PCP:  No primary care provider on file. Note Started: 8:16 AM EST      CHIEF COMPLAINT       Chief Complaint   Patient presents with    Toe Pain     Right great toe pain x 1 day       HISTORY OF PRESENT ILLNESS  (Location/Symptom, Timing/Onset, Context/Setting, Quality, Duration, Modifying Factors, Severity.)      Naldo Wang is a 48 y.o. male who presents with right great toe pain of 1 day duration. Patient reports a history of gout. No trauma. Reports recent gout flare in the right knee as well. Has not tried any anti-inflammatories. Patient denies any systemic signs including fever or chills. NKDA. PAST MEDICAL / SURGICAL / SOCIAL / FAMILY HISTORY      has a past medical history of Gout.       has a past surgical history that includes Appendectomy and [place] tooth extraction.       Social History     Socioeconomic History    Marital status:      Spouse name: Not on file    Number of children: Not on file    Years of education: Not on file    Highest education level: Not on file   Occupational History    Not on file   Tobacco Use    Smoking status: Every Day     Packs/day: 0.25     Years: 23.00     Pack years: 5.75     Types: Cigarettes    Smokeless tobacco: Never   Vaping Use    Vaping Use: Never used   Substance and Sexual Activity    Alcohol use: Yes     Comment: socially    Drug use: No    Sexual activity: Not on file   Other Topics Concern    Not on file   Social History Narrative    Not on file     Social Determinants of Health     Financial Resource Strain: Not on file   Food Insecurity: Not on file   Transportation Needs: Not on file   Physical Activity: Not on file   Stress: Not on file   Social Connections: Not on file   Intimate Partner Violence: Not on file   Housing Stability: Not on file Family History   Problem Relation Age of Onset    High Blood Pressure Mother     Alzheimer's Disease Father        Allergies:  Patient has no known allergies. Home Medications:  Prior to Admission medications    Medication Sig Start Date End Date Taking? Authorizing Provider   meloxicam (MOBIC) 7.5 MG tablet Take 1 tablet by mouth daily as needed for Pain 2/21/23  Yes Evelin Smith, DO       REVIEW OF SYSTEMS       Review of Systems   Constitutional:  Negative for chills and fever. HENT:  Negative for congestion. Gastrointestinal:  Negative for abdominal pain. Genitourinary:  Negative for flank pain. Musculoskeletal:  Positive for gait problem (pain walking on right great toe). Skin:  Negative for wound. PHYSICAL EXAM      INITIAL VITALS:   BP (!) 146/78   Pulse 83   Temp 98.3 °F (36.8 °C) (Oral)   Resp 20   Ht 5' 6\" (1.676 m)   Wt 174 lb (78.9 kg)   SpO2 96%   BMI 28.08 kg/m²     Physical Exam  Constitutional:       General: He is not in acute distress. HENT:      Head: Normocephalic. Mouth/Throat:      Pharynx: Oropharynx is clear. Eyes:      Conjunctiva/sclera: Conjunctivae normal.   Pulmonary:      Effort: Pulmonary effort is normal.   Abdominal:      Palpations: Abdomen is soft. Musculoskeletal:         General: No swelling. Comments: Right great toe: No gross deformity, mild joint swelling, no clear effusion. Skin intact w/o laceration. There are multiple calluses about the foot    Distal sensation intact, pedal pulses palpable, <2s cap refill. ROM intact    Calf compartments soft    Skin:     General: Skin is warm. Capillary Refill: Capillary refill takes less than 2 seconds. Neurological:      General: No focal deficit present. Mental Status: He is oriented to person, place, and time.          DDX/DIAGNOSTIC RESULTS / EMERGENCY DEPARTMENT COURSE / MDM     Medical Decision Making  Amount and/or Complexity of Data Reviewed  Radiology: ordered. Risk  OTC drugs. Prescription drug management. EKG  none    All EKG's are interpreted by the Emergency Department Physician who either signs or Co-signs this chart in the absence of a cardiologist.    EMERGENCY DEPARTMENT COURSE:      ED Course as of 02/24/23 1304   Tue Feb 21, 2023   1002    IMPRESSION:  1. Soft tissue swelling of the great toe. Otherwise, no clear evidence for  gouty arthropathy. 2. Mild degenerative changes as above. Mild plantar calcaneal spur. No  acute fracture or dislocation. [LR]      ED Course User Index  [LR] Evelin Smith DO   Discussed findings with patient. He agrees to follow up with his PCP regarding ongoing pain. Understands to return ot the ER for any new or wowrseing symptoms. PROCEDURES:  none    CONSULTS:  None    CRITICAL CARE:  See attending note     FINAL IMPRESSION      1.  Pain of right great toe          DISPOSITION / PLAN     DISPOSITION Decision To Discharge 02/21/2023 10:19:58 AM      PATIENT REFERRED TO:  Vivian Beard, APRN - CNP  [address]  [phone]      As needed, If symptoms worsen    [facility]  [address] [facility]  [phone]    If symptoms worsen, As needed    DISCHARGE MEDICATIONS:  Discharge Medication List as of 2/21/2023 10:22 AM        START taking these medications    Details   meloxicam (MOBIC) 7.5 MG tablet Take 1 tablet by mouth daily as needed for Pain, Disp-14 tablet, R-0Print             Rm Graham DO  Emergency Medicine Resident    (Please note that portions of thisnote were completed with a voice recognition program.  Efforts were made to edit the dictations but occasionally words are mis-transcribed.)       Rm Graham DO  Resident  02/24/23 7124

## 2023-02-21 NOTE — DISCHARGE INSTRUCTIONS
You are seen in the emergency department for right great toe pain. Your x-rays show no definitive evidence of x-ray or gouty arthropathy. There was a small calcaneal spur noted. These are usually incidental findings from chronic plantar fasciitis or inflammation. You may take anti-inflammatories and Tylenol as directed below for pain. Medications: You may take tylenol 1,000mg by mouth every 6 hours as needed for pain. Do not exceed 4,000mg per day. If you have liver disease don't take tylenol. You may also take ibuprofen 600mg every 6-8 hours as needed for pain. Do not exceed 2,400 mg per day. If you experience stomach pain or you have a history of kidney disease stop taking ibuprofen. You may alternate application of ice and heat 20 minutes at a time as desired. You were prescribed meloxicam for pain. This is more infrequent dosing and often more effective for joint pain. Please do not take this with ibuprofen or naproxen as they are the same class of medications.

## 2023-02-24 ASSESSMENT — ENCOUNTER SYMPTOMS: ABDOMINAL PAIN: 0

## 2023-06-04 ENCOUNTER — APPOINTMENT (OUTPATIENT)
Dept: GENERAL RADIOLOGY | Age: 53
End: 2023-06-04
Payer: MEDICARE

## 2023-06-04 ENCOUNTER — HOSPITAL ENCOUNTER (EMERGENCY)
Age: 53
Discharge: HOME | End: 2023-06-04
Attending: EMERGENCY MEDICINE
Payer: MEDICARE

## 2023-06-04 VITALS
SYSTOLIC BLOOD PRESSURE: 145 MMHG | WEIGHT: 174 LBS | HEART RATE: 80 BPM | DIASTOLIC BLOOD PRESSURE: 89 MMHG | OXYGEN SATURATION: 98 % | TEMPERATURE: 98.6 F | RESPIRATION RATE: 16 BRPM | BODY MASS INDEX: 27.97 KG/M2 | HEIGHT: 66 IN

## 2023-06-04 DIAGNOSIS — M10.9 ACUTE GOUT OF LEFT KNEE, UNSPECIFIED CAUSE: Primary | ICD-10-CM

## 2023-06-04 PROCEDURE — 6370000000 HC RX 637 (ALT 250 FOR IP)

## 2023-06-04 PROCEDURE — 73564 X-RAY EXAM KNEE 4 OR MORE: CPT

## 2023-06-04 PROCEDURE — 99283 EMERGENCY DEPT VISIT LOW MDM: CPT

## 2023-06-04 RX ORDER — PREDNISONE 20 MG/1
40 TABLET ORAL ONCE
Status: COMPLETED | OUTPATIENT
Start: 2023-06-04 | End: 2023-06-04

## 2023-06-04 RX ORDER — NAPROXEN 250 MG/1
750 TABLET ORAL ONCE
Status: COMPLETED | OUTPATIENT
Start: 2023-06-04 | End: 2023-06-04

## 2023-06-04 RX ORDER — PREDNISONE 20 MG/1
TABLET ORAL
Qty: 10 TABLET | Refills: 0 | Status: SHIPPED | OUTPATIENT
Start: 2023-06-04 | End: 2023-06-14

## 2023-06-04 RX ORDER — NAPROXEN 250 MG/1
250 TABLET ORAL 2 TIMES DAILY WITH MEALS
Qty: 10 TABLET | Refills: 0 | Status: SHIPPED | OUTPATIENT
Start: 2023-06-04 | End: 2023-06-09

## 2023-06-04 RX ADMIN — NAPROXEN 750 MG: 250 TABLET ORAL at 10:13

## 2023-06-04 RX ADMIN — PREDNISONE 40 MG: 20 TABLET ORAL at 10:13

## 2023-06-04 ASSESSMENT — PAIN SCALES - GENERAL
PAINLEVEL_OUTOF10: 9
PAINLEVEL_OUTOF10: 8

## 2023-06-04 ASSESSMENT — PAIN - FUNCTIONAL ASSESSMENT: PAIN_FUNCTIONAL_ASSESSMENT: 0-10

## 2023-06-04 ASSESSMENT — ENCOUNTER SYMPTOMS
DIARRHEA: 0
SHORTNESS OF BREATH: 0
SINUS PRESSURE: 0
NAUSEA: 0
COUGH: 0
ABDOMINAL PAIN: 0
VOMITING: 0

## 2023-06-04 ASSESSMENT — PAIN DESCRIPTION - LOCATION: LOCATION: KNEE

## 2023-06-04 ASSESSMENT — PAIN DESCRIPTION - ORIENTATION: ORIENTATION: LEFT

## 2023-07-12 ENCOUNTER — HOSPITAL ENCOUNTER (EMERGENCY)
Age: 53
Discharge: HOME | End: 2023-07-12
Attending: EMERGENCY MEDICINE
Payer: MEDICARE

## 2023-07-12 VITALS
OXYGEN SATURATION: 98 % | TEMPERATURE: 97.3 F | DIASTOLIC BLOOD PRESSURE: 83 MMHG | WEIGHT: 171.3 LBS | HEART RATE: 77 BPM | SYSTOLIC BLOOD PRESSURE: 142 MMHG | RESPIRATION RATE: 18 BRPM | BODY MASS INDEX: 27.65 KG/M2

## 2023-07-12 DIAGNOSIS — M25.562 ACUTE PAIN OF LEFT KNEE: Primary | ICD-10-CM

## 2023-07-12 DIAGNOSIS — M10.9 ACUTE GOUT OF LEFT KNEE, UNSPECIFIED CAUSE: ICD-10-CM

## 2023-07-12 PROCEDURE — 99283 EMERGENCY DEPT VISIT LOW MDM: CPT

## 2023-07-12 PROCEDURE — 6370000000 HC RX 637 (ALT 250 FOR IP): Performed by: EMERGENCY MEDICINE

## 2023-07-12 RX ORDER — NAPROXEN 250 MG/1
500 TABLET ORAL ONCE
Status: COMPLETED | OUTPATIENT
Start: 2023-07-12 | End: 2023-07-12

## 2023-07-12 RX ORDER — PREDNISONE 20 MG/1
50 TABLET ORAL ONCE
Status: COMPLETED | OUTPATIENT
Start: 2023-07-12 | End: 2023-07-12

## 2023-07-12 RX ORDER — PREDNISONE 50 MG/1
50 TABLET ORAL DAILY
Qty: 5 TABLET | Refills: 0 | Status: SHIPPED | OUTPATIENT
Start: 2023-07-12 | End: 2023-07-17

## 2023-07-12 RX ORDER — NAPROXEN 500 MG/1
500 TABLET ORAL 2 TIMES DAILY
Qty: 60 TABLET | Refills: 0 | Status: SHIPPED | OUTPATIENT
Start: 2023-07-12

## 2023-07-12 RX ADMIN — PREDNISONE 50 MG: 20 TABLET ORAL at 08:29

## 2023-07-12 RX ADMIN — NAPROXEN 500 MG: 250 TABLET ORAL at 08:29

## 2023-07-12 ASSESSMENT — PAIN DESCRIPTION - DESCRIPTORS: DESCRIPTORS: DISCOMFORT

## 2023-07-12 ASSESSMENT — ENCOUNTER SYMPTOMS
BLOOD IN STOOL: 0
SHORTNESS OF BREATH: 0
ABDOMINAL PAIN: 0
DIARRHEA: 0
NAUSEA: 0
VOMITING: 0

## 2023-07-12 ASSESSMENT — PAIN DESCRIPTION - PAIN TYPE: TYPE: ACUTE PAIN

## 2023-07-12 ASSESSMENT — PAIN DESCRIPTION - ORIENTATION: ORIENTATION: LEFT

## 2023-07-12 ASSESSMENT — PAIN DESCRIPTION - FREQUENCY: FREQUENCY: CONTINUOUS

## 2023-07-12 ASSESSMENT — PAIN - FUNCTIONAL ASSESSMENT: PAIN_FUNCTIONAL_ASSESSMENT: 0-10

## 2023-07-12 ASSESSMENT — PAIN DESCRIPTION - LOCATION: LOCATION: KNEE

## 2023-07-12 ASSESSMENT — PAIN SCALES - GENERAL: PAINLEVEL_OUTOF10: 9

## 2023-07-12 NOTE — ED NOTES
Pt to ED with c/o L knee pain x 2 months. Pt reports hx of gout stating \"I can't take this pain much longer. \" Pt ambulatory to room with slow, steady gait limping on L knee.       Renata Schwartz RN  07/12/23 2965

## 2025-01-11 ENCOUNTER — HOSPITAL ENCOUNTER (EMERGENCY)
Age: 55
Discharge: HOME | End: 2025-01-11
Attending: EMERGENCY MEDICINE
Payer: MEDICARE

## 2025-01-11 VITALS
WEIGHT: 169.75 LBS | HEART RATE: 75 BPM | DIASTOLIC BLOOD PRESSURE: 90 MMHG | TEMPERATURE: 97.7 F | BODY MASS INDEX: 27.4 KG/M2 | SYSTOLIC BLOOD PRESSURE: 143 MMHG | OXYGEN SATURATION: 99 % | RESPIRATION RATE: 16 BRPM

## 2025-01-11 DIAGNOSIS — M10.9 ACUTE GOUT OF RIGHT KNEE, UNSPECIFIED CAUSE: Primary | ICD-10-CM

## 2025-01-11 PROCEDURE — 99283 EMERGENCY DEPT VISIT LOW MDM: CPT | Performed by: EMERGENCY MEDICINE

## 2025-01-11 RX ORDER — NAPROXEN 500 MG/1
500 TABLET ORAL 2 TIMES DAILY
Qty: 20 TABLET | Refills: 0 | Status: SHIPPED | OUTPATIENT
Start: 2025-01-11

## 2025-01-11 RX ORDER — IBUPROFEN 800 MG/1
1 TABLET, FILM COATED ORAL EVERY 8 HOURS PRN
COMMUNITY
End: 2025-01-11

## 2025-01-11 RX ORDER — PREDNISONE 50 MG/1
50 TABLET ORAL DAILY
Qty: 5 TABLET | Refills: 0 | Status: SHIPPED | OUTPATIENT
Start: 2025-01-11 | End: 2025-01-16

## 2025-01-11 ASSESSMENT — ENCOUNTER SYMPTOMS
VOMITING: 0
SHORTNESS OF BREATH: 0
ABDOMINAL PAIN: 0
COUGH: 0
NAUSEA: 0

## 2025-01-11 ASSESSMENT — PAIN SCALES - GENERAL: PAINLEVEL_OUTOF10: 10

## 2025-01-11 ASSESSMENT — PAIN - FUNCTIONAL ASSESSMENT: PAIN_FUNCTIONAL_ASSESSMENT: 0-10

## 2025-01-11 ASSESSMENT — PAIN DESCRIPTION - ORIENTATION: ORIENTATION: RIGHT

## 2025-01-11 ASSESSMENT — PAIN DESCRIPTION - LOCATION: LOCATION: KNEE

## 2025-01-11 NOTE — ED PROVIDER NOTES
[facility]     Emergency Department     Faculty Attestation    I performed a history and physical examination of the patient and discussed management with the resident. I have reviewed and agree with the resident’s findings including all diagnostic interpretations, and treatment plans as written at the time of my review. Any areas of disagreement are noted on the chart. I was personally present for the key portions of any procedures. I have documented in the chart those procedures where I was not present during the key portions. For Physician Assistant/ Nurse Practitioner cases/documentation I have personally evaluated this patient and have completed at least one if not all key elements of the E/M (history, physical exam, and MDM). Additional findings are as noted.    PtName: Jolly Boone  MRN: [id number]  Birthdate 1970  Date of evaluation: 1/11/25  Note Started: 10:02 AM EST    Primary Care Physician: No primary care provider on file.        History: This is a 54 y.o. male who presents to the Emergency Department with complaint of knee pain.  History of gout.  Patient is complaining of pain to the right knee that is typical for his gout flareup.  Denies any trauma.  Denies any fever    Physical:   weight is 77 kg (169 lb 12.1 oz). His temperature is 97.7 °F (36.5 °C). His blood pressure is 143/90 (abnormal) and his pulse is 75. His respiration is 16 and oxygen saturation is 99%.  Tenderness to palpation in the right knee.  Pain with range of motion.  No significant erythema warmth    Impression: Right knee pain, history of gout    Plan: Patient will be given prednisone and Naprosyn which she states works for his gout in the past.    Medical Decision Making  Problems Addressed:  Acute gout of right knee, unspecified cause: acute illness or injury    Risk  Prescription drug management.            (Please note that portions of this note were completed with

## 2025-01-11 NOTE — DISCHARGE INSTRUCTIONS
Patient to follow-up with your primary care provider.  Return to emergency department for any acute concerns or worsening symptoms.

## 2025-01-11 NOTE — ED NOTES
Patient to ED for pain and swelling of his right knee. Patient states he has a hx of gout and thinks he is having a flare up. Denies any injury. Patient states he took motrin for pain but still rates pain 10/10. Patient alert and oriented x4, talking in complete sentences. Respirations even and unlabored. Call light in reach, all needs met at this time

## 2025-01-11 NOTE — ED PROVIDER NOTES
[facility] EMERGENCY DEPARTMENT  Emergency Department Encounter  Emergency Medicine Resident     Pt Name:Jolly Boone  MRN: [id number]  Birthdate 1970  Date of evaluation: 1/11/25  PCP:  No primary care provider on file.  Note Started: 9:54 AM EST      CHIEF COMPLAINT       Chief Complaint   Patient presents with    Joint Swelling     Right, thinks Gout is flaring up       HISTORY OF PRESENT ILLNESS  (Location/Symptom, Timing/Onset, Context/Setting, Quality, Duration, Modifying Factors, Severity.)      Jolly Boone is a 54 y.o. male who presents with right-sided knee pain.  States this started when he woke up this morning.  Denies trauma or inciting event.  Known history of gout with similar symptoms in right knee during previous flares.  On chart review has been treated with NSAIDs and prednisone in the past with good results.  Denies any other symptoms.  Denies fever, chills, nausea, vomiting, chest pain, shortness breath, cough, abdominal pain.  Has not taken anything for symptoms so far.    PAST MEDICAL / SURGICAL / SOCIAL / FAMILY HISTORY      has a past medical history of Gout.       has a past surgical history that includes Appendectomy and [place] tooth extraction.      Social History     Socioeconomic History    Marital status:      Spouse name: Not on file    Number of children: Not on file    Years of education: Not on file    Highest education level: Not on file   Occupational History    Not on file   Tobacco Use    Smoking status: Every Day     Current packs/day: 0.25     Average packs/day: 0.3 packs/day for 23.0 years (5.8 ttl pk-yrs)     Types: Cigarettes    Smokeless tobacco: Never   Vaping Use    Vaping status: Never Used   Substance and Sexual Activity    Alcohol use: Yes     Comment: socially    Drug use: No    Sexual activity: Not on file   Other Topics Concern    Not on file   Social History Narrative    Not on file     Social Determinants of Health     Financial

## 2025-03-17 ENCOUNTER — HOSPITAL ENCOUNTER (EMERGENCY)
Age: 55
Discharge: HOME | End: 2025-03-17
Attending: EMERGENCY MEDICINE
Payer: MEDICARE

## 2025-03-17 VITALS
RESPIRATION RATE: 19 BRPM | HEIGHT: 66 IN | HEART RATE: 66 BPM | BODY MASS INDEX: 23.63 KG/M2 | OXYGEN SATURATION: 99 % | SYSTOLIC BLOOD PRESSURE: 123 MMHG | DIASTOLIC BLOOD PRESSURE: 83 MMHG | WEIGHT: 147 LBS | TEMPERATURE: 98.2 F

## 2025-03-17 DIAGNOSIS — M62.830 SPASM OF MUSCLE OF LOWER BACK: Primary | ICD-10-CM

## 2025-03-17 PROCEDURE — 6370000000 HC RX 637 (ALT 250 FOR IP): Performed by: EMERGENCY MEDICINE

## 2025-03-17 PROCEDURE — 99284 EMERGENCY DEPT VISIT MOD MDM: CPT

## 2025-03-17 PROCEDURE — 96372 THER/PROPH/DIAG INJ SC/IM: CPT

## 2025-03-17 PROCEDURE — 6360000002 HC RX W HCPCS: Performed by: EMERGENCY MEDICINE

## 2025-03-17 RX ORDER — METHOCARBAMOL 500 MG/1
1500 TABLET, FILM COATED ORAL ONCE
Status: COMPLETED | OUTPATIENT
Start: 2025-03-17 | End: 2025-03-17

## 2025-03-17 RX ORDER — KETOROLAC TROMETHAMINE 30 MG/ML
30 INJECTION, SOLUTION INTRAMUSCULAR; INTRAVENOUS ONCE
Status: COMPLETED | OUTPATIENT
Start: 2025-03-17 | End: 2025-03-17

## 2025-03-17 RX ORDER — IBUPROFEN 800 MG/1
800 TABLET, FILM COATED ORAL EVERY 8 HOURS PRN
Qty: 30 TABLET | Refills: 0 | Status: SHIPPED | OUTPATIENT
Start: 2025-03-17

## 2025-03-17 RX ORDER — METHOCARBAMOL 750 MG/1
750 TABLET, FILM COATED ORAL 4 TIMES DAILY
Qty: 28 TABLET | Refills: 0 | Status: SHIPPED | OUTPATIENT
Start: 2025-03-17 | End: 2025-03-24

## 2025-03-17 RX ADMIN — METHOCARBAMOL 1500 MG: 500 TABLET ORAL at 13:07

## 2025-03-17 RX ADMIN — KETOROLAC TROMETHAMINE 30 MG: 30 INJECTION, SOLUTION INTRAMUSCULAR at 13:07

## 2025-03-17 ASSESSMENT — PAIN DESCRIPTION - LOCATION
LOCATION: BACK
LOCATION: BACK

## 2025-03-17 ASSESSMENT — PAIN DESCRIPTION - ORIENTATION
ORIENTATION: LOWER
ORIENTATION: LOWER

## 2025-03-17 ASSESSMENT — LIFESTYLE VARIABLES
HOW OFTEN DO YOU HAVE A DRINK CONTAINING ALCOHOL: NEVER
HOW MANY STANDARD DRINKS CONTAINING ALCOHOL DO YOU HAVE ON A TYPICAL DAY: PATIENT DOES NOT DRINK

## 2025-03-17 ASSESSMENT — PAIN - FUNCTIONAL ASSESSMENT
PAIN_FUNCTIONAL_ASSESSMENT: ACTIVITIES ARE NOT PREVENTED
PAIN_FUNCTIONAL_ASSESSMENT: 0-10

## 2025-03-17 ASSESSMENT — PAIN DESCRIPTION - DESCRIPTORS: DESCRIPTORS: ACHING;DISCOMFORT

## 2025-03-17 ASSESSMENT — PAIN SCALES - GENERAL
PAINLEVEL_OUTOF10: 9
PAINLEVEL_OUTOF10: 9

## 2025-03-17 ASSESSMENT — ENCOUNTER SYMPTOMS: BACK PAIN: 1

## 2025-03-17 NOTE — DISCHARGE INSTRUCTIONS
Your diagnosis today is: Muscle spasm in the lower back    Medications you received today: Toradol (anti-inflammatory, injection); Robaxin (muscle spasm relaxer)    Prescriptions: Robaxin -you may take this up to 4 times a day for muscle spasms.  Do not drive until you know that it does not make you sleepy or altered.  Ibuprofen-anti-inflammatory, take every 8 hours as needed for pain    Next steps that you need to take: Continue gentle stretches, you may use heat or ice whichever feels better.    Return to the Emergency Department if: You have any episodes of passing out, worsening pain, numbness or tingling in your legs, difficulty walking, or any difficulty urinating    Thank you for choosing [facility]

## 2025-03-17 NOTE — ED PROVIDER NOTES
signs or Co-signs this chart in the absence of a cardiologist.      RADIOLOGY:All plain film, CT, MRI, and formal ultrasound images (except ED bedside ultrasound) are read by the radiologist, see reports below, unless otherwisenoted in [facility] or here.  No orders to display     LABS: All lab results were reviewed by myself, and all abnormals are listed below.  Labs Reviewed - No data to display  EMERGENCY DEPARTMENTCOURSE:   Vitals:    Vitals:    03/17/25 1236   BP: 123/83   Pulse: 66   Resp: 19   Temp: 98.2 °F (36.8 °C)   SpO2: 99%   Weight: 66.7 kg (147 lb)   Height: 1.676 m (5' 6\")       The patient was given the following medications while in the emergency department:  Orders Placed This Encounter   Medications    methocarbamol (ROBAXIN) tablet 1,500 mg    ketorolac (TORADOL) injection 30 mg    methocarbamol (ROBAXIN-750) 750 MG tablet     Sig: Take 1 tablet by mouth 4 times daily for 7 days     Dispense:  28 tablet     Refill:  0    ibuprofen (ADVIL;MOTRIN) 800 MG tablet     Sig: Take 1 tablet by mouth every 8 hours as needed for Pain     Dispense:  30 tablet     Refill:  0     CONSULTS:  None    FINAL IMPRESSION      1. Spasm of muscle of lower back          DISPOSITION/PLAN   DISPOSITION Decision To Discharge 03/17/2025 12:57:57 PM   DISPOSITION CONDITION Stable           PATIENT REFERRED TO:  No follow-up provider specified.  DISCHARGE MEDICATIONS:  New Prescriptions    IBUPROFEN (ADVIL;MOTRIN) 800 MG TABLET    Take 1 tablet by mouth every 8 hours as needed for Pain    METHOCARBAMOL (ROBAXIN-750) 750 MG TABLET    Take 1 tablet by mouth 4 times daily for 7 days     Yenny Ayala MD  Attending Emergency Physician  [employer] voice recognition software used in portions of this document.                    Yenny Ayala MD  03/17/25 1177